# Patient Record
Sex: MALE | Race: ASIAN | NOT HISPANIC OR LATINO | URBAN - METROPOLITAN AREA
[De-identification: names, ages, dates, MRNs, and addresses within clinical notes are randomized per-mention and may not be internally consistent; named-entity substitution may affect disease eponyms.]

---

## 2018-02-19 ENCOUNTER — INPATIENT (INPATIENT)
Facility: HOSPITAL | Age: 63
LOS: 9 days | Discharge: HOME HEALTH | End: 2018-03-01
Attending: PLASTIC SURGERY

## 2018-02-19 VITALS
WEIGHT: 127.43 LBS | TEMPERATURE: 99 F | RESPIRATION RATE: 18 BRPM | HEART RATE: 80 BPM | SYSTOLIC BLOOD PRESSURE: 156 MMHG | OXYGEN SATURATION: 97 %

## 2018-02-19 DIAGNOSIS — Z98.890 OTHER SPECIFIED POSTPROCEDURAL STATES: Chronic | ICD-10-CM

## 2018-02-19 RX ORDER — SODIUM BICARBONATE 1 MEQ/ML
1 SYRINGE (ML) INTRAVENOUS
Qty: 0 | Refills: 0 | COMMUNITY

## 2018-02-19 RX ORDER — INSULIN GLARGINE 100 [IU]/ML
10 INJECTION, SOLUTION SUBCUTANEOUS
Qty: 0 | Refills: 0 | COMMUNITY

## 2018-02-19 RX ORDER — PANTOPRAZOLE SODIUM 20 MG/1
40 TABLET, DELAYED RELEASE ORAL
Qty: 0 | Refills: 0 | Status: DISCONTINUED | OUTPATIENT
Start: 2018-02-19 | End: 2018-03-01

## 2018-02-19 RX ORDER — INSULIN GLARGINE 100 [IU]/ML
20 INJECTION, SOLUTION SUBCUTANEOUS EVERY MORNING
Qty: 0 | Refills: 0 | Status: DISCONTINUED | OUTPATIENT
Start: 2018-02-19 | End: 2018-02-20

## 2018-02-19 RX ORDER — DARBEPOETIN ALFA IN POLYSORBAT 200MCG/0.4
40 PEN INJECTOR (ML) SUBCUTANEOUS
Qty: 0 | Refills: 0 | COMMUNITY

## 2018-02-19 RX ORDER — INSULIN GLARGINE 100 [IU]/ML
20 INJECTION, SOLUTION SUBCUTANEOUS
Qty: 0 | Refills: 0 | COMMUNITY

## 2018-02-19 RX ORDER — ONDANSETRON 8 MG/1
4 TABLET, FILM COATED ORAL EVERY 6 HOURS
Qty: 0 | Refills: 0 | Status: DISCONTINUED | OUTPATIENT
Start: 2018-02-19 | End: 2018-03-01

## 2018-02-19 RX ORDER — DEXTROSE 50 % IN WATER 50 %
25 SYRINGE (ML) INTRAVENOUS ONCE
Qty: 0 | Refills: 0 | Status: DISCONTINUED | OUTPATIENT
Start: 2018-02-19 | End: 2018-02-20

## 2018-02-19 RX ORDER — GLUCAGON INJECTION, SOLUTION 0.5 MG/.1ML
1 INJECTION, SOLUTION SUBCUTANEOUS ONCE
Qty: 0 | Refills: 0 | Status: DISCONTINUED | OUTPATIENT
Start: 2018-02-19 | End: 2018-02-20

## 2018-02-19 RX ORDER — CLOPIDOGREL BISULFATE 75 MG/1
75 TABLET, FILM COATED ORAL DAILY
Qty: 0 | Refills: 0 | Status: DISCONTINUED | OUTPATIENT
Start: 2018-02-19 | End: 2018-03-01

## 2018-02-19 RX ORDER — HEPARIN SODIUM 5000 [USP'U]/ML
5000 INJECTION INTRAVENOUS; SUBCUTANEOUS EVERY 8 HOURS
Qty: 0 | Refills: 0 | Status: DISCONTINUED | OUTPATIENT
Start: 2018-02-19 | End: 2018-03-01

## 2018-02-19 RX ORDER — ATORVASTATIN CALCIUM 80 MG/1
1 TABLET, FILM COATED ORAL
Qty: 0 | Refills: 0 | COMMUNITY

## 2018-02-19 RX ORDER — OXYCODONE AND ACETAMINOPHEN 5; 325 MG/1; MG/1
1 TABLET ORAL EVERY 4 HOURS
Qty: 0 | Refills: 0 | Status: DISCONTINUED | OUTPATIENT
Start: 2018-02-19 | End: 2018-02-19

## 2018-02-19 RX ORDER — ACETAMINOPHEN 500 MG
650 TABLET ORAL EVERY 6 HOURS
Qty: 0 | Refills: 0 | Status: DISCONTINUED | OUTPATIENT
Start: 2018-02-19 | End: 2018-03-01

## 2018-02-19 RX ORDER — SODIUM CHLORIDE 9 MG/ML
1000 INJECTION INTRAMUSCULAR; INTRAVENOUS; SUBCUTANEOUS
Qty: 0 | Refills: 0 | Status: DISCONTINUED | OUTPATIENT
Start: 2018-02-19 | End: 2018-02-22

## 2018-02-19 RX ORDER — DOCUSATE SODIUM 100 MG
100 CAPSULE ORAL THREE TIMES A DAY
Qty: 0 | Refills: 0 | Status: DISCONTINUED | OUTPATIENT
Start: 2018-02-19 | End: 2018-03-01

## 2018-02-19 RX ORDER — FERROUS SULFATE 325(65) MG
325 TABLET ORAL
Qty: 0 | Refills: 0 | Status: DISCONTINUED | OUTPATIENT
Start: 2018-02-19 | End: 2018-03-01

## 2018-02-19 RX ORDER — LABETALOL HCL 100 MG
1 TABLET ORAL
Qty: 0 | Refills: 0 | COMMUNITY

## 2018-02-19 RX ORDER — INSULIN GLARGINE 100 [IU]/ML
10 INJECTION, SOLUTION SUBCUTANEOUS AT BEDTIME
Qty: 0 | Refills: 0 | Status: DISCONTINUED | OUTPATIENT
Start: 2018-02-19 | End: 2018-02-20

## 2018-02-19 RX ORDER — AMLODIPINE BESYLATE 2.5 MG/1
1 TABLET ORAL
Qty: 0 | Refills: 0 | COMMUNITY

## 2018-02-19 RX ORDER — LABETALOL HCL 100 MG
200 TABLET ORAL
Qty: 0 | Refills: 0 | Status: DISCONTINUED | OUTPATIENT
Start: 2018-02-19 | End: 2018-02-24

## 2018-02-19 RX ORDER — ASPIRIN/CALCIUM CARB/MAGNESIUM 324 MG
1 TABLET ORAL
Qty: 0 | Refills: 0 | COMMUNITY

## 2018-02-19 RX ORDER — SODIUM BICARBONATE 1 MEQ/ML
650 SYRINGE (ML) INTRAVENOUS
Qty: 0 | Refills: 0 | Status: DISCONTINUED | OUTPATIENT
Start: 2018-02-19 | End: 2018-02-21

## 2018-02-19 RX ORDER — DEXTROSE 50 % IN WATER 50 %
1 SYRINGE (ML) INTRAVENOUS ONCE
Qty: 0 | Refills: 0 | Status: DISCONTINUED | OUTPATIENT
Start: 2018-02-19 | End: 2018-02-20

## 2018-02-19 RX ORDER — ERGOCALCIFEROL 1.25 MG/1
50000 CAPSULE ORAL
Qty: 0 | Refills: 0 | COMMUNITY

## 2018-02-19 RX ORDER — DEXAMETHASONE 0.5 MG/5ML
60 ELIXIR ORAL DAILY
Qty: 0 | Refills: 0 | Status: DISCONTINUED | OUTPATIENT
Start: 2018-02-20 | End: 2018-02-20

## 2018-02-19 RX ORDER — PANTOPRAZOLE SODIUM 20 MG/1
1 TABLET, DELAYED RELEASE ORAL
Qty: 0 | Refills: 0 | COMMUNITY

## 2018-02-19 RX ORDER — ACETAMINOPHEN 500 MG
650 TABLET ORAL EVERY 4 HOURS
Qty: 0 | Refills: 0 | Status: DISCONTINUED | OUTPATIENT
Start: 2018-02-19 | End: 2018-03-01

## 2018-02-19 RX ORDER — SODIUM CHLORIDE 9 MG/ML
1000 INJECTION, SOLUTION INTRAVENOUS
Qty: 0 | Refills: 0 | Status: DISCONTINUED | OUTPATIENT
Start: 2018-02-19 | End: 2018-02-20

## 2018-02-19 RX ORDER — ASPIRIN/CALCIUM CARB/MAGNESIUM 324 MG
81 TABLET ORAL DAILY
Qty: 0 | Refills: 0 | Status: DISCONTINUED | OUTPATIENT
Start: 2018-02-19 | End: 2018-03-01

## 2018-02-19 RX ORDER — FERROUS SULFATE 325(65) MG
1 TABLET ORAL
Qty: 0 | Refills: 0 | COMMUNITY

## 2018-02-19 RX ORDER — DEXTROSE 50 % IN WATER 50 %
12.5 SYRINGE (ML) INTRAVENOUS ONCE
Qty: 0 | Refills: 0 | Status: DISCONTINUED | OUTPATIENT
Start: 2018-02-19 | End: 2018-02-20

## 2018-02-19 RX ORDER — SEVELAMER CARBONATE 2400 MG/1
400 POWDER, FOR SUSPENSION ORAL DAILY
Qty: 0 | Refills: 0 | Status: DISCONTINUED | OUTPATIENT
Start: 2018-02-19 | End: 2018-02-21

## 2018-02-19 RX ORDER — SENNA PLUS 8.6 MG/1
2 TABLET ORAL AT BEDTIME
Qty: 0 | Refills: 0 | Status: DISCONTINUED | OUTPATIENT
Start: 2018-02-19 | End: 2018-03-01

## 2018-02-19 RX ORDER — AMLODIPINE BESYLATE 2.5 MG/1
5 TABLET ORAL AT BEDTIME
Qty: 0 | Refills: 0 | Status: DISCONTINUED | OUTPATIENT
Start: 2018-02-19 | End: 2018-02-22

## 2018-02-19 RX ORDER — CLOPIDOGREL BISULFATE 75 MG/1
1 TABLET, FILM COATED ORAL
Qty: 0 | Refills: 0 | COMMUNITY

## 2018-02-19 RX ORDER — SENNA PLUS 8.6 MG/1
2 TABLET ORAL
Qty: 0 | Refills: 0 | COMMUNITY

## 2018-02-19 NOTE — H&P ADULT - NSHPREVIEWOFSYSTEMS_GEN_ALL_CORE
Denies fever/chills. c/o weakness, sore throat, odynophagia, eye pain , pruritic skin rash. No Cp/dyspnea, dysuria.

## 2018-02-19 NOTE — H&P ADULT - PROBLEM SELECTOR PLAN 1
Continue decadron for 2 days as per dermatology recs  -Local wound care with xeroform , keep skin moist  -F/u Derm Bx  -IVF hydration  -pain management   -DVT PPX  -Nutrition  -PT consult

## 2018-02-19 NOTE — H&P ADULT - HISTORY OF PRESENT ILLNESS
61M with h/o HTN,HLD,DM2,CKD, gastritis , CVA , arthritis admitted to the Burn Unit for Flowers Kurt syndrome , a transfer from an outside hospital.  As per the transfer note and the patient’s son account, his sx started a few days ago with a generalized pruritic rash associated with eye pain, sore throat and generalized weakness. Initially the rash was located over his flank now evolved over his back, extremities trunk and the oral mucosa.  Patient was started on allopurinol about 3-4 weeks ago for suspicion of gout arthritis that he has been tolerated well until the onset of the rash. In the ED at the transferring hospital , he was seen by dermatology , a skin biopsy was performed, also received one dose of IV steroids as per dermatology recommendations.

## 2018-02-19 NOTE — H&P ADULT - PMH
Anemia in chronic illness    Benign hypertension with CKD (chronic kidney disease) stage IV    CVA (cerebral vascular accident)    Diabetes mellitus with end-stage renal disease    Diverticulosis    Gastritis    HTN (hypertension)    Hyperlipidemia

## 2018-02-20 LAB
ANION GAP SERPL CALC-SCNC: 12 MMOL/L — SIGNIFICANT CHANGE UP (ref 7–14)
APTT BLD: 27 SEC — SIGNIFICANT CHANGE UP (ref 27–39.2)
BUN SERPL-MCNC: 60 MG/DL — HIGH (ref 10–20)
CALCIUM SERPL-MCNC: 9 MG/DL — SIGNIFICANT CHANGE UP (ref 8.5–10.1)
CHLORIDE SERPL-SCNC: 111 MMOL/L — HIGH (ref 98–110)
CO2 SERPL-SCNC: 16 MMOL/L — LOW (ref 17–32)
CREAT SERPL-MCNC: 4 MG/DL — HIGH (ref 0.7–1.5)
ESTIMATED AVERAGE GLUCOSE: 140 MG/DL — HIGH (ref 68–114)
GLUCOSE SERPL-MCNC: 175 MG/DL — HIGH (ref 70–110)
HBA1C BLD-MCNC: 6.5 % — HIGH (ref 4–5.6)
HCT VFR BLD CALC: 34.3 % — LOW (ref 42–52)
HGB BLD-MCNC: 11.2 G/DL — LOW (ref 14–18)
INR BLD: 1.22 RATIO — SIGNIFICANT CHANGE UP (ref 0.65–1.3)
MAGNESIUM SERPL-MCNC: 1.8 MG/DL — SIGNIFICANT CHANGE UP (ref 1.8–2.4)
MCHC RBC-ENTMCNC: 27.7 PG — SIGNIFICANT CHANGE UP (ref 27–31)
MCHC RBC-ENTMCNC: 32.7 G/DL — SIGNIFICANT CHANGE UP (ref 32–37)
MCV RBC AUTO: 84.7 FL — SIGNIFICANT CHANGE UP (ref 80–94)
NRBC # BLD: 0 /100 WBCS — SIGNIFICANT CHANGE UP (ref 0–0)
PHOSPHATE SERPL-MCNC: 5.9 MG/DL — HIGH (ref 2.1–4.9)
PLATELET # BLD AUTO: 260 K/UL — SIGNIFICANT CHANGE UP (ref 130–400)
POTASSIUM SERPL-MCNC: 4.2 MMOL/L — SIGNIFICANT CHANGE UP (ref 3.5–5)
POTASSIUM SERPL-SCNC: 4.2 MMOL/L — SIGNIFICANT CHANGE UP (ref 3.5–5)
PROTHROM AB SERPL-ACNC: 13.2 SEC — HIGH (ref 9.95–12.87)
RBC # BLD: 4.05 M/UL — LOW (ref 4.7–6.1)
RBC # FLD: 15.1 % — HIGH (ref 11.5–14.5)
SODIUM SERPL-SCNC: 139 MMOL/L — SIGNIFICANT CHANGE UP (ref 135–146)
WBC # BLD: 5.28 K/UL — SIGNIFICANT CHANGE UP (ref 4.8–10.8)
WBC # FLD AUTO: 5.28 K/UL — SIGNIFICANT CHANGE UP (ref 4.8–10.8)

## 2018-02-20 RX ORDER — GLUCAGON INJECTION, SOLUTION 0.5 MG/.1ML
1 INJECTION, SOLUTION SUBCUTANEOUS ONCE
Qty: 0 | Refills: 0 | Status: DISCONTINUED | OUTPATIENT
Start: 2018-02-20 | End: 2018-03-01

## 2018-02-20 RX ORDER — DEXAMETHASONE 0.5 MG/5ML
ELIXIR ORAL
Qty: 0 | Refills: 0 | Status: DISCONTINUED | OUTPATIENT
Start: 2018-02-20 | End: 2018-02-20

## 2018-02-20 RX ORDER — INSULIN LISPRO 100/ML
5 VIAL (ML) SUBCUTANEOUS
Qty: 0 | Refills: 0 | Status: DISCONTINUED | OUTPATIENT
Start: 2018-02-20 | End: 2018-02-20

## 2018-02-20 RX ORDER — DEXTROSE 50 % IN WATER 50 %
25 SYRINGE (ML) INTRAVENOUS ONCE
Qty: 0 | Refills: 0 | Status: DISCONTINUED | OUTPATIENT
Start: 2018-02-20 | End: 2018-03-01

## 2018-02-20 RX ORDER — INSULIN GLARGINE 100 [IU]/ML
20 INJECTION, SOLUTION SUBCUTANEOUS EVERY MORNING
Qty: 0 | Refills: 0 | Status: DISCONTINUED | OUTPATIENT
Start: 2018-02-20 | End: 2018-02-22

## 2018-02-20 RX ORDER — DEXTROSE 50 % IN WATER 50 %
12.5 SYRINGE (ML) INTRAVENOUS ONCE
Qty: 0 | Refills: 0 | Status: DISCONTINUED | OUTPATIENT
Start: 2018-02-20 | End: 2018-03-01

## 2018-02-20 RX ORDER — INSULIN LISPRO 100/ML
5 VIAL (ML) SUBCUTANEOUS ONCE
Qty: 0 | Refills: 0 | Status: COMPLETED | OUTPATIENT
Start: 2018-02-20 | End: 2018-02-20

## 2018-02-20 RX ORDER — INSULIN GLARGINE 100 [IU]/ML
10 INJECTION, SOLUTION SUBCUTANEOUS AT BEDTIME
Qty: 0 | Refills: 0 | Status: DISCONTINUED | OUTPATIENT
Start: 2018-02-20 | End: 2018-02-22

## 2018-02-20 RX ORDER — INSULIN LISPRO 100/ML
VIAL (ML) SUBCUTANEOUS
Qty: 0 | Refills: 0 | Status: DISCONTINUED | OUTPATIENT
Start: 2018-02-20 | End: 2018-03-01

## 2018-02-20 RX ORDER — SODIUM CHLORIDE 9 MG/ML
1000 INJECTION, SOLUTION INTRAVENOUS
Qty: 0 | Refills: 0 | Status: DISCONTINUED | OUTPATIENT
Start: 2018-02-20 | End: 2018-03-01

## 2018-02-20 RX ORDER — WATER 99.05 ML/100ML
1 SOLUTION OPHTHALMIC DAILY
Qty: 0 | Refills: 0 | Status: DISCONTINUED | OUTPATIENT
Start: 2018-02-20 | End: 2018-02-21

## 2018-02-20 RX ORDER — DEXTROSE 50 % IN WATER 50 %
1 SYRINGE (ML) INTRAVENOUS ONCE
Qty: 0 | Refills: 0 | Status: DISCONTINUED | OUTPATIENT
Start: 2018-02-20 | End: 2018-03-01

## 2018-02-20 RX ORDER — INSULIN HUMAN 100 [IU]/ML
8 INJECTION, SOLUTION SUBCUTANEOUS ONCE
Qty: 0 | Refills: 0 | Status: COMPLETED | OUTPATIENT
Start: 2018-02-20 | End: 2018-02-20

## 2018-02-20 RX ORDER — BENZOCAINE AND MENTHOL 5; 1 G/100ML; G/100ML
1 LIQUID ORAL
Qty: 0 | Refills: 0 | Status: DISCONTINUED | OUTPATIENT
Start: 2018-02-20 | End: 2018-03-01

## 2018-02-20 RX ADMIN — Medication 650 MILLIGRAM(S): at 06:31

## 2018-02-20 RX ADMIN — Medication 5 UNIT(S): at 17:05

## 2018-02-20 RX ADMIN — HEPARIN SODIUM 5000 UNIT(S): 5000 INJECTION INTRAVENOUS; SUBCUTANEOUS at 06:30

## 2018-02-20 RX ADMIN — Medication 650 MILLIGRAM(S): at 17:52

## 2018-02-20 RX ADMIN — Medication 200 MILLIGRAM(S): at 17:52

## 2018-02-20 RX ADMIN — Medication 100 MILLIGRAM(S): at 16:57

## 2018-02-20 RX ADMIN — HEPARIN SODIUM 5000 UNIT(S): 5000 INJECTION INTRAVENOUS; SUBCUTANEOUS at 21:40

## 2018-02-20 RX ADMIN — INSULIN GLARGINE 10 UNIT(S): 100 INJECTION, SOLUTION SUBCUTANEOUS at 21:49

## 2018-02-20 RX ADMIN — Medication 100 MILLIGRAM(S): at 21:49

## 2018-02-20 RX ADMIN — HEPARIN SODIUM 5000 UNIT(S): 5000 INJECTION INTRAVENOUS; SUBCUTANEOUS at 16:58

## 2018-02-20 RX ADMIN — AMLODIPINE BESYLATE 5 MILLIGRAM(S): 2.5 TABLET ORAL at 21:48

## 2018-02-20 RX ADMIN — Medication 325 MILLIGRAM(S): at 16:55

## 2018-02-20 RX ADMIN — Medication 81 MILLIGRAM(S): at 16:55

## 2018-02-20 RX ADMIN — Medication 10: at 17:24

## 2018-02-20 RX ADMIN — Medication 200 MILLIGRAM(S): at 06:31

## 2018-02-20 RX ADMIN — CLOPIDOGREL BISULFATE 75 MILLIGRAM(S): 75 TABLET, FILM COATED ORAL at 16:56

## 2018-02-20 RX ADMIN — SEVELAMER CARBONATE 400 MILLIGRAM(S): 2400 POWDER, FOR SUSPENSION ORAL at 16:56

## 2018-02-20 RX ADMIN — INSULIN HUMAN 8 UNIT(S): 100 INJECTION, SOLUTION SUBCUTANEOUS at 21:49

## 2018-02-20 RX ADMIN — PANTOPRAZOLE SODIUM 40 MILLIGRAM(S): 20 TABLET, DELAYED RELEASE ORAL at 06:30

## 2018-02-20 RX ADMIN — INSULIN GLARGINE 20 UNIT(S): 100 INJECTION, SOLUTION SUBCUTANEOUS at 12:52

## 2018-02-20 RX ADMIN — Medication 325 MILLIGRAM(S): at 17:04

## 2018-02-20 RX ADMIN — Medication 100 MILLIGRAM(S): at 06:30

## 2018-02-20 RX ADMIN — Medication 325 MILLIGRAM(S): at 09:38

## 2018-02-20 NOTE — CONSULT NOTE ADULT - ASSESSMENT
63 yo man with PMH CKD 4 , HTN and Dm presenting for a transfer from [facility] for SJS  ·	CKD 4 as per history   ·	creat has been around 4, was told he may need HD soon  ·	No need for RRT for now  ·	on NaHCO3 po continue  ·	on IVF ok to continue follow strict I and O  ·	Repeat UA and check Urine prot/ creat ratio  ·	CKD MBD , hyperph sevelamer continue check PTH  ·	SJS on dexamthasone     will follow

## 2018-02-20 NOTE — CONSULT NOTE ADULT - SUBJECTIVE AND OBJECTIVE BOX
NEPHROLOGY CONSULTATION NOTE    Patient is a 62y Male whom presented to the hospital sp transfer from [facility] for SJS due to allopurinol (gout) .     PAST MEDICAL & SURGICAL HISTORY:  Diverticulosis  Gastritis  Hyperlipidemia  CVA (cerebral vascular accident)  Anemia in chronic illness  Diabetes mellitus with end-stage renal disease  Benign hypertension with CKD (chronic kidney disease) stage IV  HTN (hypertension)  H/O colonoscopy  H/O endoscopy  CKD 4    Allergies:  allopurinol (Rash)  shellfish (Rash)    Home Medications Reviewed  Hospital Medications:   MEDICATIONS  (STANDING):  amLODIPine   Tablet 5 milliGRAM(s) Oral at bedtime  aspirin enteric coated 81 milliGRAM(s) Oral daily  clopidogrel Tablet 75 milliGRAM(s) Oral daily  dexamethasone  IVPB 60 milliGRAM(s) IV Intermittent daily  dextrose 5%. 1000 milliLiter(s) (50 mL/Hr) IV Continuous <Continuous>  dextrose 50% Injectable 12.5 Gram(s) IV Push once  docusate sodium 100 milliGRAM(s) Oral three times a day  ferrous sulfate Oral Tab/Cap - Peds 325 milliGRAM(s) Oral three times a day with meals  heparin  Injectable 5000 Unit(s) SubCutaneous every 8 hours  insulin glargine Injectable (LANTUS) 20 Unit(s) SubCutaneous every morning  insulin glargine Injectable (LANTUS) 10 Unit(s) SubCutaneous at bedtime  insulin lispro (HumaLOG) corrective regimen sliding scale   SubCutaneous three times a day before meals  insulin lispro Injectable (HumaLOG). 5 Unit(s) SubCutaneous once  labetalol 200 milliGRAM(s) Oral two times a day  pantoprazole    Tablet 40 milliGRAM(s) Oral before breakfast  sevelamer hydrochloride 400 milliGRAM(s) Oral daily  sodium bicarbonate 650 milliGRAM(s) Oral two times a day  sodium chloride 0.9%. 1000 milliLiter(s) (75 mL/Hr) IV Continuous <Continuous>      SOCIAL HISTORY:  Denies ETOH,Smoking,   FAMILY HISTORY:  No pertinent family history in first degree relatives        REVIEW OF SYSTEMS:  CONSTITUTIONAL: No weakness, fevers or chills  EYES/ENT: No visual changes;  No vertigo or throat pain   NECK: No pain or stiffness  RESPIRATORY: No cough, wheezing, hemoptysis; No shortness of breath  CARDIOVASCULAR: No chest pain or palpitations.  GASTROINTESTINAL: No abdominal or epigastric pain. No nausea, vomiting, or hematemesis; No diarrhea or constipation. No melena or hematochezia.  GENITOURINARY: No dysuria, frequency, foamy urine, urinary urgency, incontinence or hematuria  NEUROLOGICAL: No numbness or weakness  SKIN: No itching, burning, rashes, or lesions   VASCULAR: No bilateral lower extremity edema.   All other review of systems is negative unless indicated above.    VITALS:  T(F): 95.7 (02-20-18 @ 15:41), Max: 99 (02-19-18 @ 21:15)  HR: 80 (02-20-18 @ 15:41)  BP: 162/73 (02-20-18 @ 15:41)  RR: 18 (02-20-18 @ 15:41)  SpO2: 97% (02-19-18 @ 21:15)    02-19 @ 07:01  -  02-20 @ 07:00  --------------------------------------------------------  IN: 150 mL / OUT: 200 mL / NET: -50 mL      Height (cm): 160 (02-19 @ 22:02)  Weight (kg): 58 (02-19 @ 22:02)  BMI (kg/m2): 22.7 (02-19 @ 22:02)  BSA (m2): 1.6 (02-19 @ 22:02)      I&O's Detail    19 Feb 2018 07:01  -  20 Feb 2018 07:00  --------------------------------------------------------  IN:    sodium chloride 0.9%.: 150 mL  Total IN: 150 mL    OUT:    Voided: 200 mL  Total OUT: 200 mL    Total NET: -50 mL            PHYSICAL EXAM:  Constitutional: NAD  HEENT: anicteric sclera, oropharynx clear, MMM  Neck: No JVD  Respiratory: CTAB, no wheezes, rales or rhonchi  Cardiovascular: S1, S2, RRR  Gastrointestinal: BS+, soft, NT/ND  Extremities: No cyanosis or clubbing. No peripheral edema  Neurological: A/O x 3, no focal deficits  Psychiatric: Normal mood, normal affect  : No CVA tenderness. No espinal.   Skin: No rashes  Vascular Access:    LABS:  02-20    139  |  111<H>  |  60<H>  ----------------------------<  175<H>  4.2   |  16<L>  |  4.0<H>    Ca    9.0      20 Feb 2018 08:24  Phos  5.9     02-20  Mg     1.8     02-20      Creatinine Trend: 4.0 <--                        11.2   5.28  )-----------( 260      ( 20 Feb 2018 08:24 )             34.3     Urine Studies:              RADIOLOGY & ADDITIONAL STUDIES:  < from: Xray Chest 1 View-PORTABLE IMMEDIATE (02.20.18 @ 01:10) >  Impression:      No radiographic evidence of acute cardiopulmonary disease.    < end of copied text > NEPHROLOGY CONSULTATION NOTE    Patient is a 62y Male whom presented to the hospital sp transfer from [facility] for SJS due to allopurinol (gout) .   Renal cs was called for CKD 4   Patient is seen by nephrology as OP and was told he needed HD     PAST MEDICAL & SURGICAL HISTORY:  Diverticulosis  Gastritis  Hyperlipidemia  CVA (cerebral vascular accident)  Anemia in chronic illness  Diabetes mellitus with end-stage renal disease  Benign hypertension with CKD (chronic kidney disease) stage IV  HTN (hypertension)  H/O colonoscopy  H/O endoscopy  CKD 4    Allergies:  allopurinol (Rash)  shellfish (Rash)    Home Medications Reviewed  Hospital Medications:   MEDICATIONS  (STANDING):  amLODIPine   Tablet 5 milliGRAM(s) Oral at bedtime  aspirin enteric coated 81 milliGRAM(s) Oral daily  clopidogrel Tablet 75 milliGRAM(s) Oral daily  dexamethasone  IVPB 60 milliGRAM(s) IV Intermittent daily  dextrose 5%. 1000 milliLiter(s) (50 mL/Hr) IV Continuous <Continuous>  dextrose 50% Injectable 12.5 Gram(s) IV Push once  docusate sodium 100 milliGRAM(s) Oral three times a day  ferrous sulfate Oral Tab/Cap - Peds 325 milliGRAM(s) Oral three times a day with meals  heparin  Injectable 5000 Unit(s) SubCutaneous every 8 hours  insulin glargine Injectable (LANTUS) 20 Unit(s) SubCutaneous every morning  insulin lispro Injectable (HumaLOG). 5 Unit(s) SubCutaneous once  labetalol 200 milliGRAM(s) Oral two times a day  pantoprazole    Tablet 40 milliGRAM(s) Oral before breakfast  sevelamer hydrochloride 400 milliGRAM(s) Oral daily  sodium bicarbonate 650 milliGRAM(s) Oral two times a day  sodium chloride 0.9%. 1000 milliLiter(s) (75 mL/Hr) IV Continuous <Continuous>      SOCIAL HISTORY:  Denies ETOH,Smoking,   FAMILY HISTORY:  No pertinent family history in first degree relatives        REVIEW OF SYSTEMS:  CONSTITUTIONAL: No weakness, fevers or chills  EYES/ENT: No visual changes;  No vertigo or throat pain   NECK: No pain or stiffness  RESPIRATORY: No cough, wheezing, hemoptysis; No shortness of breath  CARDIOVASCULAR: No chest pain or palpitations.  GASTROINTESTINAL: No abdominal or epigastric pain. No nausea, vomiting, or hematemesis; No diarrhea or constipation. No melena or hematochezia.  GENITOURINARY: No dysuria, frequency, foamy urine, urinary urgency, incontinence or hematuria  NEUROLOGICAL: No numbness or weakness  SKIN: multiple skin lesions   VASCULAR: No bilateral lower extremity edema.   All other review of systems is negative unless indicated above.    VITALS:  T(F): 95.7 (02-20-18 @ 15:41), Max: 99 (02-19-18 @ 21:15)  HR: 80 (02-20-18 @ 15:41)  BP: 162/73 (02-20-18 @ 15:41)  RR: 18 (02-20-18 @ 15:41)  SpO2: 97% (02-19-18 @ 21:15)    02-19 @ 07:01  -  02-20 @ 07:00  --------------------------------------------------------  IN: 150 mL / OUT: 200 mL / NET: -50 mL      Height (cm): 160 (02-19 @ 22:02)  Weight (kg): 58 (02-19 @ 22:02)  BMI (kg/m2): 22.7 (02-19 @ 22:02)  BSA (m2): 1.6 (02-19 @ 22:02)      I&O's Detail    19 Feb 2018 07:01  -  20 Feb 2018 07:00  --------------------------------------------------------  IN:    sodium chloride 0.9%.: 150 mL  Total IN: 150 mL    OUT:    Voided: 200 mL  Total OUT: 200 mL    Total NET: -50 mL            PHYSICAL EXAM:  Constitutional: NAD  HEENT: anicteric sclera, oropharynx clear, MMM  Neck: No JVD  Respiratory: CTAB, no wheezes, rales or rhonchi  Cardiovascular: S1, S2, RRR  Gastrointestinal: BS+, soft, NT/ND  Extremities: No cyanosis or clubbing. No peripheral edema  Neurological: A/O x 3, no focal deficits  Psychiatric: Normal mood, normal affect  : No CVA tenderness. No espinal.   Skin: diffuse rash   Vascular Access:    LABS:  02-20    139  |  111<H>  |  60<H>  ----------------------------<  175<H>  4.2   |  16<L>  |  4.0<H>    Ca    9.0      20 Feb 2018 08:24  Phos  5.9     02-20  Mg     1.8     02-20      Creatinine Trend: 4.0 <--                        11.2   5.28  )-----------( 260      ( 20 Feb 2018 08:24 )             34.3       RADIOLOGY & ADDITIONAL STUDIES:  < from: Xray Chest 1 View-PORTABLE IMMEDIATE (02.20.18 @ 01:10) >  Impression:      No radiographic evidence of acute cardiopulmonary disease.    < end of copied text >

## 2018-02-20 NOTE — SWALLOW BEDSIDE ASSESSMENT ADULT - SWALLOW EVAL: DIAGNOSIS
Mild oral dysphagia for thin liquids, puree, mechanical soft with no s/s aspiration/penetration. Pt with c/o sore lips when eating hard food.

## 2018-02-20 NOTE — PROGRESS NOTE ADULT - ASSESSMENT
1) Hemodynamics: stable--> continue IV fluids    2) Cardiac: stable--> continue monitoring    3) Respiratory:--> stable --> RA O2    4) GI: No ileus--> p o    5) Renal: adequate function-->I/O    6) ID: no infection    7) Nutrition: cont tube feeds/ tpn    8) TEN/SJS: continue local wound care/

## 2018-02-20 NOTE — PROGRESS NOTE ADULT - SUBJECTIVE AND OBJECTIVE BOX
1 hour critical care medicine  allergic reaction to allopurinol--> extensive rash including eyes--> tolerating po  Vital Signs Last 24 Hrs  T(C): 36.3 (20 Feb 2018 21:13), Max: 36.4 (20 Feb 2018 00:16)  T(F): 97.3 (20 Feb 2018 21:13), Max: 97.5 (20 Feb 2018 00:16)  HR: 80 (20 Feb 2018 15:41) (77 - 80)  BP: 162/73 (20 Feb 2018 15:41) (160/77 - 162/82)  BP(mean): --  RR: 18 (20 Feb 2018 15:41) (18 - 18)  SpO2: 97% (20 Feb 2018 21:30) (97% - 97%)    CVP:  T(C): 36.3 (02-20-18 @ 21:13), Max: 36.4 (02-20-18 @ 00:16)  HR: 80 (02-20-18 @ 15:41) (77 - 80)  BP: 162/73 (02-20-18 @ 15:41) (160/77 - 162/82)  RR: 18 (02-20-18 @ 15:41) (18 - 18)  SpO2: 97% (02-20-18 @ 21:30) (97% - 97%)  CVP(mm Hg): --    U.O.:  I&O's Detail    19 Feb 2018 07:01  -  20 Feb 2018 07:00  --------------------------------------------------------  IN:    sodium chloride 0.9%.: 150 mL  Total IN: 150 mL    OUT:    Voided: 200 mL  Total OUT: 200 mL    Total NET: -50 mL      20 Feb 2018 07:01  -  20 Feb 2018 22:45  --------------------------------------------------------  IN:    sodium chloride 0.9%.: 225 mL  Total IN: 225 mL    OUT:    Voided: 1455 mL  Total OUT: 1455 mL    Total NET: -1230 mL          CXR:                            11.2   5.28  )-----------( 260      ( 20 Feb 2018 08:24 )             34.3     02-20    139  |  111<H>  |  60<H>  ----------------------------<  175<H>  4.2   |  16<L>  |  4.0<H>    Ca    9.0      20 Feb 2018 08:24  Phos  5.9     02-20  Mg     1.8     02-20        Large Dressing Change--> rash palms, soles, back, abdomen, face , scalp, arms and legs

## 2018-02-20 NOTE — CONSULT NOTE ADULT - SUBJECTIVE AND OBJECTIVE BOX
T(C): 36.1 (02-20-18 @ 07:44), Max: 37.2 (02-19-18 @ 21:15)  HR: 78 (02-20-18 @ 07:44) (77 - 80)  BP: 160/77 (02-20-18 @ 07:44) (156/- - 162/82)  RR: 18 (02-20-18 @ 07:44) (18 - 18)  SpO2: 97% (02-19-18 @ 21:15) (97% - 97%)    MOTOR EXAM      Physical Medicine And Rehabilitation Services are not indicated in this patient for the following Reason(s):    [    ] Patient is medically unstable      [    ]  Patient does not have appropriate activity orders      [     ] Patient has no weight bearing status for:      [  x   ] Patient is independently ambulating      [     ]  Patient is from Skilled Nursing Facility and is appropriate to return      [     ] Patient was non-ambulatory prior to admission      [     ]  Other      WILL CANCEL PM&R / PT request

## 2018-02-21 LAB
ALBUMIN SERPL ELPH-MCNC: 2.7 G/DL — LOW (ref 3–5.5)
ALP SERPL-CCNC: 78 U/L — SIGNIFICANT CHANGE UP (ref 30–115)
ALT FLD-CCNC: 38 U/L — SIGNIFICANT CHANGE UP (ref 0–41)
ANION GAP SERPL CALC-SCNC: 8 MMOL/L — SIGNIFICANT CHANGE UP (ref 7–14)
AST SERPL-CCNC: 32 U/L — SIGNIFICANT CHANGE UP (ref 0–41)
BASOPHILS # BLD AUTO: 0.02 K/UL — SIGNIFICANT CHANGE UP (ref 0–0.2)
BASOPHILS NFR BLD AUTO: 0.1 % — SIGNIFICANT CHANGE UP (ref 0–1)
BILIRUB SERPL-MCNC: 0.4 MG/DL — SIGNIFICANT CHANGE UP (ref 0.2–1.2)
BUN SERPL-MCNC: 77 MG/DL — CRITICAL HIGH (ref 10–20)
CALCIUM SERPL-MCNC: 8.1 MG/DL — LOW (ref 8.5–10.1)
CHLORIDE SERPL-SCNC: 114 MMOL/L — HIGH (ref 98–110)
CO2 SERPL-SCNC: 17 MMOL/L — SIGNIFICANT CHANGE UP (ref 17–32)
CREAT SERPL-MCNC: 3.4 MG/DL — HIGH (ref 0.7–1.5)
EOSINOPHIL # BLD AUTO: 0.01 K/UL — SIGNIFICANT CHANGE UP (ref 0–0.7)
EOSINOPHIL NFR BLD AUTO: 0.1 % — SIGNIFICANT CHANGE UP (ref 0–8)
GLUCOSE SERPL-MCNC: 205 MG/DL — HIGH (ref 70–110)
HCT VFR BLD CALC: 30.4 % — LOW (ref 42–52)
HCT VFR BLD CALC: 31.4 % — LOW (ref 42–52)
HGB BLD-MCNC: 10.2 G/DL — LOW (ref 14–18)
HGB BLD-MCNC: 10.4 G/DL — LOW (ref 14–18)
IMM GRANULOCYTES NFR BLD AUTO: 0.8 % — HIGH (ref 0.1–0.3)
LYMPHOCYTES # BLD AUTO: 1.34 K/UL — SIGNIFICANT CHANGE UP (ref 1.2–3.4)
LYMPHOCYTES # BLD AUTO: 7.8 % — LOW (ref 20.5–51.1)
MAGNESIUM SERPL-MCNC: 1.7 MG/DL — LOW (ref 1.8–2.4)
MCHC RBC-ENTMCNC: 27.7 PG — SIGNIFICANT CHANGE UP (ref 27–31)
MCHC RBC-ENTMCNC: 28.1 PG — SIGNIFICANT CHANGE UP (ref 27–31)
MCHC RBC-ENTMCNC: 33.1 G/DL — SIGNIFICANT CHANGE UP (ref 32–37)
MCHC RBC-ENTMCNC: 33.6 G/DL — SIGNIFICANT CHANGE UP (ref 32–37)
MCV RBC AUTO: 83.7 FL — SIGNIFICANT CHANGE UP (ref 80–94)
MCV RBC AUTO: 83.7 FL — SIGNIFICANT CHANGE UP (ref 80–94)
MONOCYTES # BLD AUTO: 1.18 K/UL — HIGH (ref 0.1–0.6)
MONOCYTES NFR BLD AUTO: 6.9 % — SIGNIFICANT CHANGE UP (ref 1.7–9.3)
NEUTROPHILS # BLD AUTO: 14.5 K/UL — HIGH (ref 1.4–6.5)
NEUTROPHILS NFR BLD AUTO: 84.3 % — HIGH (ref 42.2–75.2)
NRBC # BLD: 0 /100 WBCS — SIGNIFICANT CHANGE UP (ref 0–0)
NRBC # BLD: 0 /100 WBCS — SIGNIFICANT CHANGE UP (ref 0–0)
PHOSPHATE SERPL-MCNC: 3.5 MG/DL — SIGNIFICANT CHANGE UP (ref 2.1–4.9)
PLATELET # BLD AUTO: 243 K/UL — SIGNIFICANT CHANGE UP (ref 130–400)
PLATELET # BLD AUTO: 266 K/UL — SIGNIFICANT CHANGE UP (ref 130–400)
POTASSIUM SERPL-MCNC: 4.3 MMOL/L — SIGNIFICANT CHANGE UP (ref 3.5–5)
POTASSIUM SERPL-SCNC: 4.3 MMOL/L — SIGNIFICANT CHANGE UP (ref 3.5–5)
PROT SERPL-MCNC: 5.4 G/DL — LOW (ref 6–8)
RBC # BLD: 3.63 M/UL — LOW (ref 4.7–6.1)
RBC # BLD: 3.75 M/UL — LOW (ref 4.7–6.1)
RBC # FLD: 15 % — HIGH (ref 11.5–14.5)
RBC # FLD: 15.4 % — HIGH (ref 11.5–14.5)
SODIUM SERPL-SCNC: 139 MMOL/L — SIGNIFICANT CHANGE UP (ref 135–146)
WBC # BLD: 15.24 K/UL — HIGH (ref 4.8–10.8)
WBC # BLD: 17.18 K/UL — HIGH (ref 4.8–10.8)
WBC # FLD AUTO: 15.24 K/UL — HIGH (ref 4.8–10.8)
WBC # FLD AUTO: 17.18 K/UL — HIGH (ref 4.8–10.8)

## 2018-02-21 RX ORDER — IMMUNE GLOBULIN,GAMMA(IGG) 5 %
25 VIAL (ML) INTRAVENOUS EVERY 24 HOURS
Qty: 0 | Refills: 0 | Status: COMPLETED | OUTPATIENT
Start: 2018-02-21 | End: 2018-02-25

## 2018-02-21 RX ORDER — IMMUNE GLOBULIN,GAMMA(IGG) 5 %
25 VIAL (ML) INTRAVENOUS EVERY 24 HOURS
Qty: 0 | Refills: 0 | Status: DISCONTINUED | OUTPATIENT
Start: 2018-02-21 | End: 2018-02-21

## 2018-02-21 RX ORDER — BENZOCAINE 10 %
1 GEL (GRAM) MUCOUS MEMBRANE THREE TIMES A DAY
Qty: 0 | Refills: 0 | Status: DISCONTINUED | OUTPATIENT
Start: 2018-02-21 | End: 2018-03-01

## 2018-02-21 RX ORDER — SODIUM BICARBONATE 1 MEQ/ML
650 SYRINGE (ML) INTRAVENOUS EVERY 8 HOURS
Qty: 0 | Refills: 0 | Status: DISCONTINUED | OUTPATIENT
Start: 2018-02-21 | End: 2018-03-01

## 2018-02-21 RX ORDER — SEVELAMER CARBONATE 2400 MG/1
400 POWDER, FOR SUSPENSION ORAL
Qty: 0 | Refills: 0 | Status: DISCONTINUED | OUTPATIENT
Start: 2018-02-21 | End: 2018-02-22

## 2018-02-21 RX ORDER — MAGNESIUM SULFATE 500 MG/ML
2 VIAL (ML) INJECTION ONCE
Qty: 0 | Refills: 0 | Status: COMPLETED | OUTPATIENT
Start: 2018-02-21 | End: 2018-02-22

## 2018-02-21 RX ADMIN — SEVELAMER CARBONATE 400 MILLIGRAM(S): 2400 POWDER, FOR SUSPENSION ORAL at 18:16

## 2018-02-21 RX ADMIN — INSULIN GLARGINE 20 UNIT(S): 100 INJECTION, SOLUTION SUBCUTANEOUS at 08:53

## 2018-02-21 RX ADMIN — Medication 200 MILLIGRAM(S): at 18:16

## 2018-02-21 RX ADMIN — Medication 650 MILLIGRAM(S): at 09:30

## 2018-02-21 RX ADMIN — Medication 650 MILLIGRAM(S): at 09:01

## 2018-02-21 RX ADMIN — SEVELAMER CARBONATE 400 MILLIGRAM(S): 2400 POWDER, FOR SUSPENSION ORAL at 15:16

## 2018-02-21 RX ADMIN — HEPARIN SODIUM 5000 UNIT(S): 5000 INJECTION INTRAVENOUS; SUBCUTANEOUS at 05:35

## 2018-02-21 RX ADMIN — CLOPIDOGREL BISULFATE 75 MILLIGRAM(S): 75 TABLET, FILM COATED ORAL at 12:44

## 2018-02-21 RX ADMIN — Medication 41.67 GRAM(S): at 21:15

## 2018-02-21 RX ADMIN — PANTOPRAZOLE SODIUM 40 MILLIGRAM(S): 20 TABLET, DELAYED RELEASE ORAL at 08:56

## 2018-02-21 RX ADMIN — Medication 1 DROP(S): at 23:23

## 2018-02-21 RX ADMIN — INSULIN GLARGINE 10 UNIT(S): 100 INJECTION, SOLUTION SUBCUTANEOUS at 23:16

## 2018-02-21 RX ADMIN — AMLODIPINE BESYLATE 5 MILLIGRAM(S): 2.5 TABLET ORAL at 23:15

## 2018-02-21 RX ADMIN — Medication 1 DROP(S): at 18:11

## 2018-02-21 RX ADMIN — Medication 2: at 18:12

## 2018-02-21 RX ADMIN — Medication 2: at 08:56

## 2018-02-21 RX ADMIN — Medication 325 MILLIGRAM(S): at 18:13

## 2018-02-21 RX ADMIN — Medication 650 MILLIGRAM(S): at 23:15

## 2018-02-21 RX ADMIN — Medication 81 MILLIGRAM(S): at 12:43

## 2018-02-21 RX ADMIN — HEPARIN SODIUM 5000 UNIT(S): 5000 INJECTION INTRAVENOUS; SUBCUTANEOUS at 15:17

## 2018-02-21 RX ADMIN — SODIUM CHLORIDE 75 MILLILITER(S): 9 INJECTION INTRAMUSCULAR; INTRAVENOUS; SUBCUTANEOUS at 18:24

## 2018-02-21 RX ADMIN — Medication 4: at 12:42

## 2018-02-21 RX ADMIN — Medication 100 MILLIGRAM(S): at 05:34

## 2018-02-21 RX ADMIN — Medication 650 MILLIGRAM(S): at 05:35

## 2018-02-21 RX ADMIN — Medication 100 MILLIGRAM(S): at 15:18

## 2018-02-21 RX ADMIN — Medication 100 MILLIGRAM(S): at 23:15

## 2018-02-21 RX ADMIN — Medication 325 MILLIGRAM(S): at 12:42

## 2018-02-21 RX ADMIN — HEPARIN SODIUM 5000 UNIT(S): 5000 INJECTION INTRAVENOUS; SUBCUTANEOUS at 23:16

## 2018-02-21 RX ADMIN — Medication 200 MILLIGRAM(S): at 05:35

## 2018-02-21 RX ADMIN — Medication 325 MILLIGRAM(S): at 08:55

## 2018-02-21 RX ADMIN — Medication 1 SPRAY(S): at 23:15

## 2018-02-21 NOTE — PROGRESS NOTE ADULT - ASSESSMENT
ASSESSMENT/ PLAN :   Stable but guarded   TENS  bx (+) results from outside hospital ~ 27 % involvement. Local wound care to open sites         begin IVIG tx  GI: dysphagia- continue soft mechanical diet with thick liq per S+S rec. FT if worsens  Ophthalmologic- continue eye tx  DM  - elevated BS may be related to steroid tx. cont monitoring and regimen  CKD- discussed with nephrology. Increase Sevalamer and monitor fluid intake closely.  Continue VTE / GI prophylaxis   Recent CVA- continue ASA and Plavix.   OT , PT     Pain mgmt  Htn : continue antihypertensive regimen and monitoring                   Care discussed with patient and son

## 2018-02-21 NOTE — PROGRESS NOTE ADULT - ASSESSMENT
63 yo man with PMH CKD 4 , HTN and Dm presenting for a transfer from [facility] for SJS  ·	CKD 4 stable    ·	creat has been around 4, was told he may need HD soon  ·	No need for RRT for now  ·	on NaHCO3 po continue increase to 650 mg q8h  ·	on IVF ok to continue follow strict I and O  ·	Repeat UA and check Urine prot/ creat ratio  ·	CKD MBD , hyperphosphatemia on  sevelamer continue and increase to 1 tab po q8h check PTH  ·	SJS on dexamthasone no antibiotics     will follow

## 2018-02-21 NOTE — PROGRESS NOTE ADULT - SUBJECTIVE AND OBJECTIVE BOX
Nephrology progress note    Patient is seen and examined, events over the last 24 h noted .  No events no complaints   D/W son at bedside     Allergies:  allopurinol (Rash)  shellfish (Rash)    Hospital Medications:   MEDICATIONS  (STANDING):  amLODIPine   Tablet 5 milliGRAM(s) Oral at bedtime  aspirin enteric coated 81 milliGRAM(s) Oral daily  clopidogrel Tablet 75 milliGRAM(s) Oral daily  docusate sodium 100 milliGRAM(s) Oral three times a day  ferrous sulfate Oral Tab/Cap - Peds 325 milliGRAM(s) Oral three times a day with meals  heparin  Injectable 5000 Unit(s) SubCutaneous every 8 hours  labetalol 200 milliGRAM(s) Oral two times a day  ophthalmic irrigation, extraocular 1 Application(s) Both EYES daily  pantoprazole    Tablet 40 milliGRAM(s) Oral before breakfast  sevelamer hydrochloride 400 milliGRAM(s) Oral daily  sodium bicarbonate 650 milliGRAM(s) Oral two times a day  sodium chloride 0.9%. 1000 milliLiter(s) (75 mL/Hr) IV Continuous <Continuous>        VITALS:  T(F): 96.5 (02-21-18 @ 08:24), Max: 97.3 (02-20-18 @ 21:13)  HR: 97 (02-21-18 @ 08:24)  BP: 158/73 (02-21-18 @ 08:24)  RR: 19 (02-21-18 @ 08:24)  SpO2: 97% (02-20-18 @ 23:17)      02-19 @ 07:01  -  02-20 @ 07:00  --------------------------------------------------------  IN: 150 mL / OUT: 200 mL / NET: -50 mL    02-20 @ 07:01  -  02-21 @ 07:00  --------------------------------------------------------  IN: 900 mL / OUT: 2455 mL / NET: -1555 mL    02-21 @ 07:01  -  02-21 @ 14:19  --------------------------------------------------------  IN: 450 mL / OUT: 350 mL / NET: 100 mL          PHYSICAL EXAM:  Constitutional: NAD  HEENT: anicteric sclera, oropharynx clear, MMM  Neck: No JVD  Respiratory: CTAB, no wheezes, rales or rhonchi  Cardiovascular: S1, S2, RRR  Gastrointestinal: BS+, soft, NT/ND  Extremities: No cyanosis or clubbing. No peripheral edema   Skin: diffuse rash and petechia     LABS:  02-20    139  |  111<H>  |  60<H>  ----------------------------<  175<H>  4.2   |  16<L>  |  4.0<H>    Ca    9.0      20 Feb 2018 08:24  Phos  5.9     02-20  Mg     1.8     02-20                            10.2   17.18 )-----------( 243      ( 21 Feb 2018 11:43 )             30.4       Urine Studies:      RADIOLOGY & ADDITIONAL STUDIES:  < from: Xray Chest 1 View-PORTABLE IMMEDIATE (02.20.18 @ 01:10) >  Impression:      No radiographic evidence of acute cardiopulmonary disease.    < end of copied text >

## 2018-02-21 NOTE — PROGRESS NOTE ADULT - SUBJECTIVE AND OBJECTIVE BOX
Pt seen and discussed on am rounds  Pt c/o increased general malaise and dysphagia today, though eyes less irritated   No acute events o/n  Vital Signs Last 24 Hrs  T, Max: 97.1 (21 Feb 2018 16:48)  HR: 71 (21 Feb 2018 21:15) (71 - 97)  BP: 165/73 (21 Feb 2018 21:15) (148/70 - 175/79)  BP(mean): --  RR: 18 (21 Feb 2018 21:15) (18 - 19)  SpO2: 97% (21 Feb 2018 21:15) (97% - 97%)        I&O's Summary    20 Feb 2018 07:01  -  21 Feb 2018 07:00  --------------------------------------------------------  IN: 975 mL / OUT: 2455 mL / NET: -1480 mL    21 Feb 2018 07:01  -  21 Feb 2018 22:20  --------------------------------------------------------  IN: 900 mL / OUT: 700 mL / NET: 200 mL  voiding adequate amts        02-21    139  |  114<H>  |  77<HH>  ----------------------------<  205<H>  4.3   |  17  |  3.4<H>    Ca    8.1<L>      21 Feb 2018 17:39  Phos  3.5     02-21  Mg     1.7     02-21    TPro  5.4<L>  /  Alb  2.7<L>  /  TBili  0.4  /  DBili  x   /  AST  32  /  ALT  38  /  AlkPhos  78  02-21                          10.4   15.24 )-----------( 266      ( 21 Feb 2018 17:39 )             31.4     CAPILLARY BLOOD GLUCOSE  243 (21 Feb 2018 21:02)  197 (21 Feb 2018 17:08)  228 (21 Feb 2018 11:52)  168 (21 Feb 2018 08:24)  166 (21 Feb 2018 05:36)  247 (21 Feb 2018 00:41)      PT: awake alert   WOUNDS:   Eyes: less crusted drainage.  Intraoral- ulcerations of lips, buccal mucosa and hard palate  ext and torso : generalized maculo- papular rash scalp , lower back, scattered areas bLE ,  slightly increased on  palms, soles and abdomen- with few small open wounds

## 2018-02-22 LAB
-  AMIKACIN: SIGNIFICANT CHANGE UP
-  AMIKACIN: SIGNIFICANT CHANGE UP
-  AMPICILLIN/SULBACTAM: SIGNIFICANT CHANGE UP
-  AMPICILLIN/SULBACTAM: SIGNIFICANT CHANGE UP
-  AMPICILLIN: SIGNIFICANT CHANGE UP
-  AMPICILLIN: SIGNIFICANT CHANGE UP
-  AZTREONAM: SIGNIFICANT CHANGE UP
-  AZTREONAM: SIGNIFICANT CHANGE UP
-  CEFAZOLIN: SIGNIFICANT CHANGE UP
-  CEFAZOLIN: SIGNIFICANT CHANGE UP
-  CEFEPIME: SIGNIFICANT CHANGE UP
-  CEFEPIME: SIGNIFICANT CHANGE UP
-  CEFOXITIN: SIGNIFICANT CHANGE UP
-  CEFOXITIN: SIGNIFICANT CHANGE UP
-  CEFTAZIDIME: SIGNIFICANT CHANGE UP
-  CEFTAZIDIME: SIGNIFICANT CHANGE UP
-  CEFTRIAXONE: SIGNIFICANT CHANGE UP
-  CEFTRIAXONE: SIGNIFICANT CHANGE UP
-  CIPROFLOXACIN: SIGNIFICANT CHANGE UP
-  CIPROFLOXACIN: SIGNIFICANT CHANGE UP
-  ERTAPENEM: SIGNIFICANT CHANGE UP
-  ERTAPENEM: SIGNIFICANT CHANGE UP
-  GENTAMICIN: SIGNIFICANT CHANGE UP
-  GENTAMICIN: SIGNIFICANT CHANGE UP
-  IMIPENEM: SIGNIFICANT CHANGE UP
-  IMIPENEM: SIGNIFICANT CHANGE UP
-  LEVOFLOXACIN: SIGNIFICANT CHANGE UP
-  LEVOFLOXACIN: SIGNIFICANT CHANGE UP
-  MEROPENEM: SIGNIFICANT CHANGE UP
-  MEROPENEM: SIGNIFICANT CHANGE UP
-  PIPERACILLIN/TAZOBACTAM: SIGNIFICANT CHANGE UP
-  PIPERACILLIN/TAZOBACTAM: SIGNIFICANT CHANGE UP
-  TOBRAMYCIN: SIGNIFICANT CHANGE UP
-  TOBRAMYCIN: SIGNIFICANT CHANGE UP
-  TRIMETHOPRIM/SULFAMETHOXAZOLE: SIGNIFICANT CHANGE UP
-  TRIMETHOPRIM/SULFAMETHOXAZOLE: SIGNIFICANT CHANGE UP
ANION GAP SERPL CALC-SCNC: 8 MMOL/L — SIGNIFICANT CHANGE UP (ref 7–14)
APTT BLD: 51 SEC — HIGH (ref 27–39.2)
BUN SERPL-MCNC: 65 MG/DL — CRITICAL HIGH (ref 10–20)
CALCIUM SERPL-MCNC: 8.2 MG/DL — LOW (ref 8.5–10.1)
CALCIUM SERPL-MCNC: 8.5 MG/DL — SIGNIFICANT CHANGE UP (ref 8.4–10.5)
CHLORIDE SERPL-SCNC: 115 MMOL/L — HIGH (ref 98–110)
CO2 SERPL-SCNC: 17 MMOL/L — SIGNIFICANT CHANGE UP (ref 17–32)
CREAT SERPL-MCNC: 3 MG/DL — HIGH (ref 0.7–1.5)
CULTURE RESULTS: NO GROWTH — SIGNIFICANT CHANGE UP
CULTURE RESULTS: SIGNIFICANT CHANGE UP
GLUCOSE SERPL-MCNC: 93 MG/DL — SIGNIFICANT CHANGE UP (ref 70–110)
HCT VFR BLD CALC: 32.2 % — LOW (ref 42–52)
HGB BLD-MCNC: 10.6 G/DL — LOW (ref 14–18)
INR BLD: 1.16 RATIO — SIGNIFICANT CHANGE UP (ref 0.65–1.3)
MAGNESIUM SERPL-MCNC: 2.1 MG/DL — SIGNIFICANT CHANGE UP (ref 1.8–2.4)
MCHC RBC-ENTMCNC: 27.7 PG — SIGNIFICANT CHANGE UP (ref 27–31)
MCHC RBC-ENTMCNC: 32.9 G/DL — SIGNIFICANT CHANGE UP (ref 32–37)
MCV RBC AUTO: 84.3 FL — SIGNIFICANT CHANGE UP (ref 80–94)
METHOD TYPE: SIGNIFICANT CHANGE UP
METHOD TYPE: SIGNIFICANT CHANGE UP
NRBC # BLD: 0 /100 WBCS — SIGNIFICANT CHANGE UP (ref 0–0)
ORGANISM # SPEC MICROSCOPIC CNT: SIGNIFICANT CHANGE UP
PHOSPHATE SERPL-MCNC: 3.7 MG/DL — SIGNIFICANT CHANGE UP (ref 2.1–4.9)
PLATELET # BLD AUTO: 241 K/UL — SIGNIFICANT CHANGE UP (ref 130–400)
POTASSIUM SERPL-MCNC: 4.5 MMOL/L — SIGNIFICANT CHANGE UP (ref 3.5–5)
POTASSIUM SERPL-SCNC: 4.5 MMOL/L — SIGNIFICANT CHANGE UP (ref 3.5–5)
PROTHROM AB SERPL-ACNC: 12.6 SEC — SIGNIFICANT CHANGE UP (ref 9.95–12.87)
PTH-INTACT FLD-MCNC: 69 PG/ML — HIGH (ref 15–65)
RBC # BLD: 3.82 M/UL — LOW (ref 4.7–6.1)
RBC # FLD: 15.7 % — HIGH (ref 11.5–14.5)
SODIUM SERPL-SCNC: 140 MMOL/L — SIGNIFICANT CHANGE UP (ref 135–146)
SPECIMEN SOURCE: SIGNIFICANT CHANGE UP
SPECIMEN SOURCE: SIGNIFICANT CHANGE UP
WBC # BLD: 10.63 K/UL — SIGNIFICANT CHANGE UP (ref 4.8–10.8)
WBC # FLD AUTO: 10.63 K/UL — SIGNIFICANT CHANGE UP (ref 4.8–10.8)

## 2018-02-22 RX ORDER — SODIUM CHLORIDE 9 MG/ML
1000 INJECTION, SOLUTION INTRAVENOUS
Qty: 0 | Refills: 0 | Status: DISCONTINUED | OUTPATIENT
Start: 2018-02-22 | End: 2018-02-23

## 2018-02-22 RX ORDER — AMLODIPINE BESYLATE 2.5 MG/1
10 TABLET ORAL AT BEDTIME
Qty: 0 | Refills: 0 | Status: DISCONTINUED | OUTPATIENT
Start: 2018-02-22 | End: 2018-03-01

## 2018-02-22 RX ORDER — SEVELAMER CARBONATE 2400 MG/1
400 POWDER, FOR SUSPENSION ORAL
Qty: 0 | Refills: 0 | Status: DISCONTINUED | OUTPATIENT
Start: 2018-02-22 | End: 2018-02-22

## 2018-02-22 RX ORDER — SEVELAMER CARBONATE 2400 MG/1
800 POWDER, FOR SUSPENSION ORAL
Qty: 0 | Refills: 0 | Status: DISCONTINUED | OUTPATIENT
Start: 2018-02-22 | End: 2018-02-23

## 2018-02-22 RX ORDER — MORPHINE SULFATE 50 MG/1
2 CAPSULE, EXTENDED RELEASE ORAL EVERY 4 HOURS
Qty: 0 | Refills: 0 | Status: DISCONTINUED | OUTPATIENT
Start: 2018-02-22 | End: 2018-02-25

## 2018-02-22 RX ORDER — MIDAZOLAM HYDROCHLORIDE 1 MG/ML
1 INJECTION, SOLUTION INTRAMUSCULAR; INTRAVENOUS ONCE
Qty: 0 | Refills: 0 | Status: DISCONTINUED | OUTPATIENT
Start: 2018-02-22 | End: 2018-02-22

## 2018-02-22 RX ORDER — DIPHENHYDRAMINE HYDROCHLORIDE AND LIDOCAINE HYDROCHLORIDE AND ALUMINUM HYDROXIDE AND MAGNESIUM HYDRO
30 KIT EVERY 4 HOURS
Qty: 0 | Refills: 0 | Status: DISCONTINUED | OUTPATIENT
Start: 2018-02-22 | End: 2018-03-01

## 2018-02-22 RX ORDER — TOBRAMYCIN AND DEXAMETHASONE 1; 3 MG/ML; MG/ML
1 SUSPENSION/ DROPS OPHTHALMIC EVERY 6 HOURS
Qty: 0 | Refills: 0 | Status: DISCONTINUED | OUTPATIENT
Start: 2018-02-22 | End: 2018-02-23

## 2018-02-22 RX ADMIN — HEPARIN SODIUM 5000 UNIT(S): 5000 INJECTION INTRAVENOUS; SUBCUTANEOUS at 13:26

## 2018-02-22 RX ADMIN — Medication 650 MILLIGRAM(S): at 07:22

## 2018-02-22 RX ADMIN — MORPHINE SULFATE 2 MILLIGRAM(S): 50 CAPSULE, EXTENDED RELEASE ORAL at 13:20

## 2018-02-22 RX ADMIN — HEPARIN SODIUM 5000 UNIT(S): 5000 INJECTION INTRAVENOUS; SUBCUTANEOUS at 21:22

## 2018-02-22 RX ADMIN — Medication 325 MILLIGRAM(S): at 13:28

## 2018-02-22 RX ADMIN — Medication 1 SPRAY(S): at 21:25

## 2018-02-22 RX ADMIN — SODIUM CHLORIDE 75 MILLILITER(S): 9 INJECTION, SOLUTION INTRAVENOUS at 16:30

## 2018-02-22 RX ADMIN — CLOPIDOGREL BISULFATE 75 MILLIGRAM(S): 75 TABLET, FILM COATED ORAL at 13:28

## 2018-02-22 RX ADMIN — MORPHINE SULFATE 2 MILLIGRAM(S): 50 CAPSULE, EXTENDED RELEASE ORAL at 16:33

## 2018-02-22 RX ADMIN — SEVELAMER CARBONATE 800 MILLIGRAM(S): 2400 POWDER, FOR SUSPENSION ORAL at 18:20

## 2018-02-22 RX ADMIN — AMLODIPINE BESYLATE 10 MILLIGRAM(S): 2.5 TABLET ORAL at 21:32

## 2018-02-22 RX ADMIN — Medication 1 DROP(S): at 05:20

## 2018-02-22 RX ADMIN — Medication 41.67 GRAM(S): at 21:18

## 2018-02-22 RX ADMIN — Medication 650 MILLIGRAM(S): at 05:20

## 2018-02-22 RX ADMIN — Medication 200 MILLIGRAM(S): at 18:27

## 2018-02-22 RX ADMIN — Medication 100 MILLIGRAM(S): at 21:22

## 2018-02-22 RX ADMIN — PANTOPRAZOLE SODIUM 40 MILLIGRAM(S): 20 TABLET, DELAYED RELEASE ORAL at 09:33

## 2018-02-22 RX ADMIN — Medication 650 MILLIGRAM(S): at 13:30

## 2018-02-22 RX ADMIN — HEPARIN SODIUM 5000 UNIT(S): 5000 INJECTION INTRAVENOUS; SUBCUTANEOUS at 05:21

## 2018-02-22 RX ADMIN — DIPHENHYDRAMINE HYDROCHLORIDE AND LIDOCAINE HYDROCHLORIDE AND ALUMINUM HYDROXIDE AND MAGNESIUM HYDRO 30 MILLILITER(S): KIT at 21:25

## 2018-02-22 RX ADMIN — Medication 200 MILLIGRAM(S): at 05:32

## 2018-02-22 RX ADMIN — Medication 325 MILLIGRAM(S): at 18:27

## 2018-02-22 RX ADMIN — Medication 100 MILLIGRAM(S): at 05:20

## 2018-02-22 RX ADMIN — Medication 50 GRAM(S): at 01:23

## 2018-02-22 RX ADMIN — Medication 1 DROP(S): at 23:35

## 2018-02-22 RX ADMIN — Medication 100 MILLIGRAM(S): at 13:29

## 2018-02-22 RX ADMIN — Medication 650 MILLIGRAM(S): at 21:21

## 2018-02-22 RX ADMIN — DIPHENHYDRAMINE HYDROCHLORIDE AND LIDOCAINE HYDROCHLORIDE AND ALUMINUM HYDROXIDE AND MAGNESIUM HYDRO 30 MILLILITER(S): KIT at 18:26

## 2018-02-22 RX ADMIN — Medication 1 DROP(S): at 13:23

## 2018-02-22 RX ADMIN — Medication 650 MILLIGRAM(S): at 05:32

## 2018-02-22 RX ADMIN — MIDAZOLAM HYDROCHLORIDE 1 MILLIGRAM(S): 1 INJECTION, SOLUTION INTRAMUSCULAR; INTRAVENOUS at 15:32

## 2018-02-22 RX ADMIN — Medication 81 MILLIGRAM(S): at 13:24

## 2018-02-22 RX ADMIN — Medication 1 DROP(S): at 18:22

## 2018-02-22 NOTE — CONSULT NOTE ADULT - SUBJECTIVE AND OBJECTIVE BOX
MEHNAZ NICOLE  MRN-[id number]  62y Male      Patient is a 62y old  Male who presents with a chief complaint of Rash on body (20 Feb 2018 05:11)    HEALTH ISSUES - R/O PROBLEM Dx:    HPI:  61M with h/o HTN,HLD,DM2,CKD, gastritis , CVA , arthritis admitted to the Burn Unit for Flowers Kurt syndrome , a transfer from an outside hospital.  As per the transfer note and the patient’s son account, his sx started a few days ago with a generalized pruritic rash associated with eye pain, sore throat and generalized weakness. Initially the rash was located over his flank now evolved over his back, extremities trunk and the oral mucosa.  Patient was started on allopurinol about 3-4 weeks ago for suspicion of gout arthritis that he has been tolerated well until the onset of the rash. In the ED at the transferring hospital , he was seen by dermatology , a skin biopsy was performed, also received one dose of IV steroids as per dermatology recommendations. (19 Feb 2018 23:26)    PAST MEDICAL & SURGICAL HISTORY:  Diverticulosis  Gastritis  Hyperlipidemia  CVA (cerebral vascular accident)  Anemia in chronic illness  Diabetes mellitus with end-stage renal disease  Benign hypertension with CKD (chronic kidney disease) stage IV  HTN (hypertension)  H/O colonoscopy  H/O endoscopy    MEDICATIONS  (STANDING):  amLODIPine   Tablet 5 milliGRAM(s) Oral at bedtime  artificial  tears Solution 1 Drop(s) Both EYES every 6 hours  aspirin enteric coated 81 milliGRAM(s) Oral daily  benzocaine 20% Spray 1 Spray(s) Topical three times a day  clopidogrel Tablet 75 milliGRAM(s) Oral daily  dextrose 5%. 1000 milliLiter(s) (50 mL/Hr) IV Continuous <Continuous>  dextrose 50% Injectable 12.5 Gram(s) IV Push once  dextrose 50% Injectable 25 Gram(s) IV Push once  dextrose 50% Injectable 25 Gram(s) IV Push once  docusate sodium 100 milliGRAM(s) Oral three times a day  ferrous sulfate Oral Tab/Cap - Peds 325 milliGRAM(s) Oral three times a day with meals  FIRST- Mouthwash  BLM 30 milliLiter(s) Swish and Spit every 4 hours  heparin  Injectable 5000 Unit(s) SubCutaneous every 8 hours  immune globulin gamma IVPB 25 Gram(s) IV Intermittent every 24 hours  insulin glargine Injectable (LANTUS) 20 Unit(s) SubCutaneous every morning  insulin glargine Injectable (LANTUS) 10 Unit(s) SubCutaneous at bedtime  insulin lispro (HumaLOG) corrective regimen sliding scale   SubCutaneous three times a day before meals  insulin lispro Injectable (HumaLOG). 5 Unit(s) SubCutaneous once  labetalol 200 milliGRAM(s) Oral two times a day  pantoprazole    Tablet 40 milliGRAM(s) Oral before breakfast  sevelamer carbonate Oral Powder - Peds 800 milliGRAM(s) Oral two times a day with meals  sodium bicarbonate 650 milliGRAM(s) Oral every 8 hours  sodium chloride 0.9%. 1000 milliLiter(s) (75 mL/Hr) IV Continuous <Continuous>    MEDICATIONS  (PRN):  acetaminophen   Tablet 650 milliGRAM(s) Oral every 4 hours PRN For Temp greater than 38.5 C (101.3 F)  acetaminophen   Tablet. 650 milliGRAM(s) Oral every 6 hours PRN Mild Pain (1 - 3)  benzocaine 15 mG/menthol 3.6 mG Lozenge 1 Lozenge Oral every 3 hours PRN Sore Throat  bisacodyl 5 milliGRAM(s) Oral daily PRN Constipation  dextrose Gel 1 Dose(s) Oral once PRN Blood Glucose LESS THAN 70 milliGRAM(s)/deciliter  glucagon  Injectable 1 milliGRAM(s) IntraMuscular once PRN Glucose LESS THAN 70 milligrams/deciliter  ondansetron Injectable 4 milliGRAM(s) IV Push every 6 hours PRN Nausea  oxyCODONE    5 mG/acetaminophen 325 mG 1 Tablet(s) Oral every 4 hours PRN Severe Pain (7 - 10)  senna 2 Tablet(s) Oral at bedtime PRN Constipation    Allergies    allopurinol (Rash)  shellfish (Rash)    Intolerances      HEALTH ISSUES - PROBLEM Dx:  CC: Pt with h/o diagnosed SJS c/o eye irritation for 1 week; reports itchy eyes with redness and irritation, denies acute change in vision, flashes, floaters eye pain  - Pt reports having rash break our 4 days ago associated with the eye redness and discomfort  - Pt is oriented and responds readily  - Pt denies flashes, floaters, eye pain OU        ENRRIQUE: 2 weeks  POHx:  Anatomically narrow angle s/p LPI OU  Proliferative Diabetic retinopathy s/p laser OD  (Pt was scheduled to have laser OS this Monday but instead was admitted to [facility]    FOHx:  noncontributory    EXAMINATION:    EXTERNAL:  ACUITY:       RIGHT EYE: 20/40sc PH: NI                   LEFT EYE: 20/80sc PH: 20/70+    EXTRAOCULAR MOVEMENTS: FULL OD/OS  PUPILS: PERRL, (-)APD  VFc: grossly full     ANTERIOR SEGMENT:    LIDS/ADENEXA:   CONJUNCTIVA/SCLERA:  CORNEA:  ANTERIOR CHAMBER:   IRIS/PUPIL:  LENS/VITREOUS:    INTRAOCULAR PRESSURE:  OD:                     OS:         POSTERIOR SEGMENT:  DFE: 1% Tropicamide, 2.5 % Phenylephrine OU    OPTIC NERVE:  MACULA:  A/V:   FUNDUS/PERIPHERY:    SUPPLEMENTAL TESTING: MEHNAZ NICOLE  MRN-[id number]  62y Male      Patient is a 62y old  Male who presents with a chief complaint of Rash on body (20 Feb 2018 05:11)    HEALTH ISSUES:    HPI:  61M with h/o HTN,HLD,DM2,CKD, gastritis , CVA , arthritis admitted to the Burn Unit for Flowers Kurt syndrome , a transfer from an outside hospital.  As per the transfer note and the patient’s son account, his sx started a few days ago with a generalized pruritic rash associated with eye pain, sore throat and generalized weakness. Initially the rash was located over his flank now evolved over his back, extremities trunk and the oral mucosa.  Patient was started on allopurinol about 3-4 weeks ago for suspicion of gout arthritis that he has been tolerated well until the onset of the rash. In the ED at the transferring hospital , he was seen by dermatology , a skin biopsy was performed, also received one dose of IV steroids as per dermatology recommendations. (19 Feb 2018 23:26)    PAST MEDICAL & SURGICAL HISTORY:  Diverticulosis  Gastritis  Hyperlipidemia  CVA (cerebral vascular accident)  Anemia in chronic illness  Diabetes mellitus with end-stage renal disease  Benign hypertension with CKD (chronic kidney disease) stage IV  HTN (hypertension)  H/O colonoscopy  H/O endoscopy    MEDICATIONS  (PRN):  acetaminophen   Tablet 650 milliGRAM(s) Oral every 4 hours PRN For Temp greater than 38.5 C (101.3 F)  acetaminophen   Tablet. 650 milliGRAM(s) Oral every 6 hours PRN Mild Pain (1 - 3)  benzocaine 15 mG/menthol 3.6 mG Lozenge 1 Lozenge Oral every 3 hours PRN Sore Throat  bisacodyl 5 milliGRAM(s) Oral daily PRN Constipation  dextrose Gel 1 Dose(s) Oral once PRN Blood Glucose LESS THAN 70 milliGRAM(s)/deciliter  glucagon  Injectable 1 milliGRAM(s) IntraMuscular once PRN Glucose LESS THAN 70 milligrams/deciliter  ondansetron Injectable 4 milliGRAM(s) IV Push every 6 hours PRN Nausea  oxyCODONE    5 mG/acetaminophen 325 mG 1 Tablet(s) Oral every 4 hours PRN Severe Pain (7 - 10)  senna 2 Tablet(s) Oral at bedtime PRN Constipation    Allergies  allopurinol (Rash)  shellfish (Rash)    Intolerances: none listed    CCOx: Pt with h/o diagnosed SJS c/o eye irritation for 1 week; reports itchy eyes with redness and irritation  - Pt reports having rash break our 4 days ago associated with the eye redness and discomfort  - Pt denies flashes, floaters, eye pain OU, denies acute change in vision OD/OS  - Pt is oriented and responds readily    ENRRIQUE: 2 weeks  POHx:  Anatomically narrow angle s/p LPI OU  Proliferative Diabetic retinopathy s/p laser OD  (Pt was scheduled to have laser OS this Monday but instead was admitted to hospitall)    FOHx:  noncontributory    EXAMINATION:    EXTERNAL:  ACUITY:       RIGHT EYE: 20/40sc PH: NI                   LEFT EYE: 20/80sc PH: 20/70+    EXTRAOCULAR MOVEMENTS: FULL OD/OS  PUPILS: PERRL, (-)APD  VFc: grossly full     ANTERIOR SEGMENT:  LIDS/ADENEXA: OD/OS: trace mucoid discharge, 1+ erythema  CONJUNCTIVA/SCLERA: pseudomembrane UL/LL, 1+ injection OU (-)symblepharon  CORNEA: cl, (-)epi defect  ANTERIOR CHAMBER: d/q OU  IRIS/PUPIL: flat, even, (-)NVI, patent PI OD/OS  LENS/VITREOUS: cl OU    INTRAOCULAR PRESSURE:  OD:  16 mmHg                   OS: 17 mmHg @12:00pm         POSTERIOR SEGMENT:  DFE: 1% Tropicamide, 2.5 % Phenylephrine OU  OPTIC NERVE: distinct, pink and healthy OU  MACULA: OD: lipid inferior temporal to macula with MAs, OS: lipid superior to macula with MAs  A/V: 2:3, (+)MAs, hemes OU  FUNDUS/PERIPHERY: flat, (-)h/b/t 360 OU

## 2018-02-22 NOTE — PROGRESS NOTE ADULT - SUBJECTIVE AND OBJECTIVE BOX
Nephrology progress note    Patient is seen and examined, events over the last 24 h noted .    Allergies:  allopurinol (Rash)  shellfish (Rash)    Hospital Medications:   MEDICATIONS  (STANDING):  amLODIPine   Tablet 5 milliGRAM(s) Oral at bedtime  artificial  tears Solution 1 Drop(s) Both EYES every 6 hours  aspirin enteric coated 81 milliGRAM(s) Oral daily  benzocaine 20% Spray 1 Spray(s) Topical three times a day  clopidogrel Tablet 75 milliGRAM(s) Oral daily  dextrose 5% + sodium chloride 0.9%. 1000 milliLiter(s) (75 mL/Hr) IV Continuous <Continuous>  dextrose 5%. 1000 milliLiter(s) (50 mL/Hr) IV Continuous <Continuous>  dextrose 50% Injectable 12.5 Gram(s) IV Push once  dextrose 50% Injectable 25 Gram(s) IV Push once  dextrose 50% Injectable 25 Gram(s) IV Push once  docusate sodium 100 milliGRAM(s) Oral three times a day  ferrous sulfate Oral Tab/Cap - Peds 325 milliGRAM(s) Oral three times a day with meals  FIRST- Mouthwash  BLM 30 milliLiter(s) Swish and Spit every 4 hours  heparin  Injectable 5000 Unit(s) SubCutaneous every 8 hours  immune globulin gamma IVPB 25 Gram(s) IV Intermittent every 24 hours  insulin lispro (HumaLOG) corrective regimen sliding scale   SubCutaneous three times a day before meals  insulin lispro Injectable (HumaLOG). 5 Unit(s) SubCutaneous once  labetalol 200 milliGRAM(s) Oral two times a day  pantoprazole    Tablet 40 milliGRAM(s) Oral before breakfast  sevelamer carbonate Oral Powder - Peds 800 milliGRAM(s) Oral two times a day with meals  sodium bicarbonate 650 milliGRAM(s) Oral every 8 hours        VITALS:  T(F): 96.5 (02-22-18 @ 15:53), Max: 97.4 (02-21-18 @ 21:45)  HR: 72 (02-22-18 @ 15:53)  BP: 163/77 (02-22-18 @ 15:53)  RR: 18 (02-22-18 @ 08:04)  SpO2: 98% (02-21-18 @ 23:25)  Wt(kg): --    02-20 @ 07:01  -  02-21 @ 07:00  --------------------------------------------------------  IN: 975 mL / OUT: 2455 mL / NET: -1480 mL    02-21 @ 07:01 - 02-22 @ 07:00  --------------------------------------------------------  IN: 1475 mL / OUT: 1325 mL / NET: 150 mL    02-22 @ 07:01 - 02-22 @ 17:39  --------------------------------------------------------  IN: 750 mL / OUT: 650 mL / NET: 100 mL          PHYSICAL EXAM:  Constitutional: NAD  HEENT: anicteric sclera, oropharynx clear, MMM  Neck: No JVD  Respiratory: CTAB, no wheezes, rales or rhonchi  Cardiovascular: S1, S2, RRR  Gastrointestinal: BS+, soft, NT/ND  Extremities: No cyanosis or clubbing. No peripheral edema  Neurological: A/O x 3, no focal deficits  : No CVA tenderness. No espinal.   Skin: No rashes  Vascular Access:    LABS:  02-21    139  |  114<H>  |  77<HH>  ----------------------------<  205<H>  4.3   |  17  |  3.4<H>    Creatinine Trend: 3.4<--, 4.0<--    Ca    8.1<L>      21 Feb 2018 17:39  Phos  3.5     02-21  Mg     1.7     02-21    TPro  5.4<L>  /  Alb  2.7<L>  /  TBili  0.4  /  DBili      /  AST  32  /  ALT  38  /  AlkPhos  78  02-21                          10.4   15.24 )-----------( 266      ( 21 Feb 2018 17:39 )             31.4       Urine Studies:      RADIOLOGY & ADDITIONAL STUDIES:

## 2018-02-22 NOTE — PROGRESS NOTE ADULT - SUBJECTIVE AND OBJECTIVE BOX
INTERVAL HISTORY:  Stable o/n. c/o increased oral pain, dysphagia. increased malaise evident. IVIg tx started last pm   Events past 24 hrs***  Vital Signs Last 24 Hrs  T(C): 36.8 (22 Feb 2018 21:33), Max: 36.8 (22 Feb 2018 21:33)  T(F): 98.2 (22 Feb 2018 21:33), Max: 98.2 (22 Feb 2018 21:33)  HR: 75 (22 Feb 2018 21:33) (70 - 87)  BP: 172/79 (22 Feb 2018 21:33) (130/66 - 176/85)  BP(mean): 114 (22 Feb 2018 21:33) (114 - 114)  RR: 18 (22 Feb 2018 08:04) (18 - 18)  SpO2: 100% (22 Feb 2018 21:33) (98% - 100%)    I&O's Summary    21 Feb 2018 07:01  -  22 Feb 2018 07:00  --------------------------------------------------------  IN: 1475 mL / OUT: 1325 mL / NET: 150 mL    22 Feb 2018 07:01  -  22 Feb 2018 21:34  --------------------------------------------------------  IN: 1140 mL / OUT: 1250 mL / NET: -110 mL    Tolerating liquids  Voiding     MEDICATIONS  (STANDING):  amLODIPine   Tablet 10 milliGRAM(s) Oral at bedtime  artificial  tears Solution 1 Drop(s) Both EYES every 6 hours  aspirin enteric coated 81 milliGRAM(s) Oral daily  benzocaine 20% Spray 1 Spray(s) Topical three times a day  clopidogrel Tablet 75 milliGRAM(s) Oral daily  dextrose 5% + sodium chloride 0.9%. 1000 milliLiter(s) (75 mL/Hr) IV Continuous <Continuous>  dextrose 5%. 1000 milliLiter(s) (50 mL/Hr) IV Continuous <Continuous>  dextrose 50% Injectable 12.5 Gram(s) IV Push once  dextrose 50% Injectable 25 Gram(s) IV Push once  dextrose 50% Injectable 25 Gram(s) IV Push once  docusate sodium 100 milliGRAM(s) Oral three times a day  ferrous sulfate Oral Tab/Cap - Peds 325 milliGRAM(s) Oral three times a day with meals  FIRST- Mouthwash  BLM 30 milliLiter(s) Swish and Spit every 4 hours  heparin  Injectable 5000 Unit(s) SubCutaneous every 8 hours  immune globulin gamma IVPB 25 Gram(s) IV Intermittent every 24 hours  insulin lispro (HumaLOG) corrective regimen sliding scale   SubCutaneous three times a day before meals  insulin lispro Injectable (HumaLOG). 5 Unit(s) SubCutaneous once  labetalol 200 milliGRAM(s) Oral two times a day  pantoprazole    Tablet 40 milliGRAM(s) Oral before breakfast  sevelamer carbonate Oral Powder - Peds 800 milliGRAM(s) Oral two times a day with meals  sodium bicarbonate 650 milliGRAM(s) Oral every 8 hours  tobramycin/dexamethasone Suspension 1 Drop(s) Both EYES every 6 hours    MEDICATIONS  (PRN):  acetaminophen   Tablet 650 milliGRAM(s) Oral every 4 hours PRN For Temp greater than 38.5 C (101.3 F)  acetaminophen   Tablet. 650 milliGRAM(s) Oral every 6 hours PRN Mild Pain (1 - 3)  benzocaine 15 mG/menthol 3.6 mG Lozenge 1 Lozenge Oral every 3 hours PRN Sore Throat  bisacodyl 5 milliGRAM(s) Oral daily PRN Constipation  dextrose Gel 1 Dose(s) Oral once PRN Blood Glucose LESS THAN 70 milliGRAM(s)/deciliter  glucagon  Injectable 1 milliGRAM(s) IntraMuscular once PRN Glucose LESS THAN 70 milligrams/deciliter  morphine  - Injectable 2 milliGRAM(s) IV Push every 4 hours PRN Moderate Pain (4 - 6)  ondansetron Injectable 4 milliGRAM(s) IV Push every 6 hours PRN Nausea  oxyCODONE    5 mG/acetaminophen 325 mG 1 Tablet(s) Oral every 4 hours PRN Severe Pain (7 - 10)  senna 2 Tablet(s) Oral at bedtime PRN Constipation    Allergies    allopurinol (Rash)  shellfish (Rash)    Intolerances        Lab Results:                        10.6   10.63 )-----------( 241      ( 22 Feb 2018 16:26 )             32.2     02-22    140  |  115<H>  |  65<HH>  ----------------------------<  93  4.5   |  17  |  3.0<H>    Ca    8.2<L>      22 Feb 2018 16:26  Phos  3.7     02-22  Mg     2.1     02-22    TPro  5.4<L>  /  Alb  2.7<L>  /  TBili  0.4  /  DBili  x   /  AST  32  /  ALT  38  /  AlkPhos  78  02-21    PT/INR - ( 22 Feb 2018 16:26 )   PT: 12.60 sec;   INR: 1.16 ratio         PTT - ( 22 Feb 2018 16:26 )  PTT:51.0 sec    LIVER FUNCTIONS - ( 21 Feb 2018 17:39 )  Alb: 2.7 g/dL / Pro: 5.4 g/dL / ALK PHOS: 78 U/L / ALT: 38 U/L / AST: 32 U/L / GGT: x           CAPILLARY BLOOD GLUCOSE  106 (22 Feb 2018 15:53)  91 (22 Feb 2018 14:47)  71 (22 Feb 2018 13:32)  61 (22 Feb 2018 13:05)  110 (22 Feb 2018 08:29)  53 (22 Feb 2018 08:04)            PHYSICAL EXAM:    General/Neuro: awake alert, less interaction     HEENT: increased facial erythema, lip ulceration, drooling, sclera injected    Chest: clear BS     Cardiovascular: reg    Abdomen: soft     Skin/Wounds: rash with slightly increased erythema abdomen, chest and  palms;   small open wounds torso.                      increased tenderness     GENERAL:   Continue pain mgmt, VTE and GI prophylaxis  Cont OT/ PT, splinting/ROM, elevation         Total Critical Care time spent by the attending physician is [] minutes, excluding procedure time.

## 2018-02-22 NOTE — PROGRESS NOTE ADULT - ASSESSMENT
ASSESSMENT/ PLAN:  CONDITION : Guarded  TENS-50% involvement; <1% open  Continue IVIg  Continue local wound care  Oral ulceration and dysphagia- FT placed after long discussion with patient and son- begin supplemental TF   CKD- continue Sevalamir, judicious hydration  Pain mgmt, VTE and GI prophylaxis   - f/u cxs   Ophthalmology evaluation and rec noted. Eyedrops ordered.

## 2018-02-22 NOTE — PROGRESS NOTE ADULT - ASSESSMENT
63 yo man with PMH CKD 4 , HTN and Dm presenting for a transfer from [facility] for SJS  ·	CKD 4 stable  - creat is trending down  ·	creat has been around 4, was told he may need HD soon  ·	No need for RRT for now  ·	on NaHCO3 po continue  650 mg q8h  ·	on IVF (poor po intake)  continue follow strict I and O  ·	Repeat UA and check Urine prot/ creat ratio  ·	CKD MBD , hyperphosphatemia on  sevelamer continue and increase to 1 tab po q8h check PTH  ·	HTN - increase Amlodipine to 10 mg qd, cont LAbetalol 200 q 12  ·	SJS on dexamthasone no antibiotics     will follow

## 2018-02-22 NOTE — CONSULT NOTE ADULT - ASSESSMENT
1. SJS with associated pseudomembranous conjunctivitis  - pseudomembranes peeled in office today without adverse ocular sequelae  START Tobradex oph sol q6H OU  START non-preserved artifical tears q6H OU  - will f/u with Pt in 3 days  2. Non proliferative Diabetic retinopathy with likely clinically significant macular edema OU s/p focal laser OD  - Pt seen with retinal specialist Dr. Kendall today  - no urgent retinal care needed at this time  - Pt to f/u with current eye care provider (in [place]) as an outpatient.  3. Anatomically Narrow Angle s/p LPI OU  - stable IOP and anterior chamber  - monitor

## 2018-02-23 LAB
ANION GAP SERPL CALC-SCNC: 6 MMOL/L — LOW (ref 7–14)
APPEARANCE UR: CLEAR — SIGNIFICANT CHANGE UP
BILIRUB UR-MCNC: NEGATIVE — SIGNIFICANT CHANGE UP
BLD GP AB SCN SERPL QL: SIGNIFICANT CHANGE UP
BUN SERPL-MCNC: 54 MG/DL — HIGH (ref 10–20)
CALCIUM SERPL-MCNC: 8 MG/DL — LOW (ref 8.5–10.1)
CALCIUM SERPL-MCNC: 8.1 MG/DL — LOW (ref 8.4–10.5)
CHLORIDE SERPL-SCNC: 112 MMOL/L — HIGH (ref 98–110)
CO2 SERPL-SCNC: 21 MMOL/L — SIGNIFICANT CHANGE UP (ref 17–32)
COLOR SPEC: YELLOW — SIGNIFICANT CHANGE UP
CREAT ?TM UR-MCNC: 66 MG/DL — SIGNIFICANT CHANGE UP
CREAT SERPL-MCNC: 2.7 MG/DL — HIGH (ref 0.7–1.5)
DIFF PNL FLD: (no result)
GLUCOSE SERPL-MCNC: 221 MG/DL — HIGH (ref 70–110)
GLUCOSE UR QL: 100 MG/DL
HCT VFR BLD CALC: 31.2 % — LOW (ref 42–52)
HGB BLD-MCNC: 10.1 G/DL — LOW (ref 14–18)
KETONES UR-MCNC: NEGATIVE — SIGNIFICANT CHANGE UP
LEUKOCYTE ESTERASE UR-ACNC: NEGATIVE — SIGNIFICANT CHANGE UP
MAGNESIUM SERPL-MCNC: 1.8 MG/DL — SIGNIFICANT CHANGE UP (ref 1.8–2.4)
MCHC RBC-ENTMCNC: 27.9 PG — SIGNIFICANT CHANGE UP (ref 27–31)
MCHC RBC-ENTMCNC: 32.4 G/DL — SIGNIFICANT CHANGE UP (ref 32–37)
MCV RBC AUTO: 86.2 FL — SIGNIFICANT CHANGE UP (ref 80–94)
NITRITE UR-MCNC: NEGATIVE — SIGNIFICANT CHANGE UP
NRBC # BLD: 0 /100 WBCS — SIGNIFICANT CHANGE UP (ref 0–0)
PH UR: 6 — SIGNIFICANT CHANGE UP (ref 5–8)
PHOSPHATE SERPL-MCNC: 3.3 MG/DL — SIGNIFICANT CHANGE UP (ref 2.1–4.9)
PLATELET # BLD AUTO: 223 K/UL — SIGNIFICANT CHANGE UP (ref 130–400)
POTASSIUM SERPL-MCNC: 4.9 MMOL/L — SIGNIFICANT CHANGE UP (ref 3.5–5)
POTASSIUM SERPL-SCNC: 4.9 MMOL/L — SIGNIFICANT CHANGE UP (ref 3.5–5)
PROT ?TM UR-MCNC: 216 MG/DL — SIGNIFICANT CHANGE UP
PROT UR-MCNC: >=300 MG/DL
PROT/CREAT UR-RTO: 3.3 RATIO — HIGH (ref 0–0.2)
PTH-INTACT FLD-MCNC: 89 PG/ML — HIGH (ref 15–65)
RBC # BLD: 3.62 M/UL — LOW (ref 4.7–6.1)
RBC # FLD: 15.6 % — HIGH (ref 11.5–14.5)
RBC CASTS # UR COMP ASSIST: SIGNIFICANT CHANGE UP /HPF
SODIUM SERPL-SCNC: 139 MMOL/L — SIGNIFICANT CHANGE UP (ref 135–146)
SP GR SPEC: 1.01 — SIGNIFICANT CHANGE UP (ref 1.01–1.03)
TYPE + AB SCN PNL BLD: SIGNIFICANT CHANGE UP
UROBILINOGEN FLD QL: 0.2 MG/DL — SIGNIFICANT CHANGE UP (ref 0.2–0.2)
WBC # BLD: 9.27 K/UL — SIGNIFICANT CHANGE UP (ref 4.8–10.8)
WBC # FLD AUTO: 9.27 K/UL — SIGNIFICANT CHANGE UP (ref 4.8–10.8)

## 2018-02-23 RX ORDER — ASCORBIC ACID 60 MG
TABLET,CHEWABLE ORAL
Qty: 0 | Refills: 0 | Status: DISCONTINUED | OUTPATIENT
Start: 2018-02-23 | End: 2018-02-23

## 2018-02-23 RX ORDER — SALICYLIC ACID 0.5 %
1 CLEANSER (GRAM) TOPICAL EVERY 4 HOURS
Qty: 0 | Refills: 0 | Status: DISCONTINUED | OUTPATIENT
Start: 2018-02-23 | End: 2018-03-01

## 2018-02-23 RX ORDER — ASCORBIC ACID 60 MG
500 TABLET,CHEWABLE ORAL DAILY
Qty: 0 | Refills: 0 | Status: DISCONTINUED | OUTPATIENT
Start: 2018-02-24 | End: 2018-03-01

## 2018-02-23 RX ORDER — ASCORBIC ACID 60 MG
TABLET,CHEWABLE ORAL
Qty: 0 | Refills: 0 | Status: DISCONTINUED | OUTPATIENT
Start: 2018-02-24 | End: 2018-03-01

## 2018-02-23 RX ORDER — ASCORBIC ACID 60 MG
500 TABLET,CHEWABLE ORAL DAILY
Qty: 0 | Refills: 0 | Status: DISCONTINUED | OUTPATIENT
Start: 2018-02-23 | End: 2018-02-23

## 2018-02-23 RX ORDER — TOBRAMYCIN AND DEXAMETHASONE 1; 3 MG/ML; MG/ML
1 SUSPENSION/ DROPS OPHTHALMIC EVERY 6 HOURS
Qty: 0 | Refills: 0 | Status: DISCONTINUED | OUTPATIENT
Start: 2018-02-23 | End: 2018-03-01

## 2018-02-23 RX ORDER — ZINC SULFATE TAB 220 MG (50 MG ZINC EQUIVALENT) 220 (50 ZN) MG
220 TAB ORAL DAILY
Qty: 0 | Refills: 0 | Status: DISCONTINUED | OUTPATIENT
Start: 2018-02-23 | End: 2018-03-01

## 2018-02-23 RX ORDER — SEVELAMER CARBONATE 2400 MG/1
800 POWDER, FOR SUSPENSION ORAL
Qty: 0 | Refills: 0 | Status: DISCONTINUED | OUTPATIENT
Start: 2018-02-23 | End: 2018-02-24

## 2018-02-23 RX ORDER — SODIUM CHLORIDE 9 MG/ML
1000 INJECTION INTRAMUSCULAR; INTRAVENOUS; SUBCUTANEOUS
Qty: 0 | Refills: 0 | Status: DISCONTINUED | OUTPATIENT
Start: 2018-02-23 | End: 2018-02-24

## 2018-02-23 RX ORDER — ASCORBIC ACID 60 MG
500 TABLET,CHEWABLE ORAL ONCE
Qty: 0 | Refills: 0 | Status: DISCONTINUED | OUTPATIENT
Start: 2018-02-23 | End: 2018-02-23

## 2018-02-23 RX ORDER — PANTOPRAZOLE SODIUM 20 MG/1
40 TABLET, DELAYED RELEASE ORAL DAILY
Qty: 0 | Refills: 0 | Status: DISCONTINUED | OUTPATIENT
Start: 2018-02-23 | End: 2018-03-01

## 2018-02-23 RX ORDER — ASCORBIC ACID 60 MG
500 TABLET,CHEWABLE ORAL ONCE
Qty: 0 | Refills: 0 | Status: COMPLETED | OUTPATIENT
Start: 2018-02-23 | End: 2018-02-24

## 2018-02-23 RX ADMIN — Medication 200 MILLIGRAM(S): at 05:48

## 2018-02-23 RX ADMIN — HEPARIN SODIUM 5000 UNIT(S): 5000 INJECTION INTRAVENOUS; SUBCUTANEOUS at 13:14

## 2018-02-23 RX ADMIN — MORPHINE SULFATE 2 MILLIGRAM(S): 50 CAPSULE, EXTENDED RELEASE ORAL at 19:33

## 2018-02-23 RX ADMIN — Medication 1 DROP(S): at 11:52

## 2018-02-23 RX ADMIN — Medication 325 MILLIGRAM(S): at 11:58

## 2018-02-23 RX ADMIN — DIPHENHYDRAMINE HYDROCHLORIDE AND LIDOCAINE HYDROCHLORIDE AND ALUMINUM HYDROXIDE AND MAGNESIUM HYDRO 30 MILLILITER(S): KIT at 17:32

## 2018-02-23 RX ADMIN — CLOPIDOGREL BISULFATE 75 MILLIGRAM(S): 75 TABLET, FILM COATED ORAL at 11:56

## 2018-02-23 RX ADMIN — Medication 1 DROP(S): at 17:32

## 2018-02-23 RX ADMIN — Medication 1 APPLICATION(S): at 22:41

## 2018-02-23 RX ADMIN — TOBRAMYCIN AND DEXAMETHASONE 1 APPLICATION(S): 1; 3 SUSPENSION/ DROPS OPHTHALMIC at 11:59

## 2018-02-23 RX ADMIN — Medication 325 MILLIGRAM(S): at 17:31

## 2018-02-23 RX ADMIN — Medication 1 APPLICATION(S): at 13:33

## 2018-02-23 RX ADMIN — Medication 100 MILLIGRAM(S): at 05:47

## 2018-02-23 RX ADMIN — Medication 1 SPRAY(S): at 22:41

## 2018-02-23 RX ADMIN — Medication 100 MILLIGRAM(S): at 22:41

## 2018-02-23 RX ADMIN — Medication 6: at 17:30

## 2018-02-23 RX ADMIN — Medication 1 APPLICATION(S): at 17:34

## 2018-02-23 RX ADMIN — HEPARIN SODIUM 5000 UNIT(S): 5000 INJECTION INTRAVENOUS; SUBCUTANEOUS at 05:48

## 2018-02-23 RX ADMIN — Medication 81 MILLIGRAM(S): at 11:55

## 2018-02-23 RX ADMIN — DIPHENHYDRAMINE HYDROCHLORIDE AND LIDOCAINE HYDROCHLORIDE AND ALUMINUM HYDROXIDE AND MAGNESIUM HYDRO 30 MILLILITER(S): KIT at 11:50

## 2018-02-23 RX ADMIN — Medication 650 MILLIGRAM(S): at 05:49

## 2018-02-23 RX ADMIN — DIPHENHYDRAMINE HYDROCHLORIDE AND LIDOCAINE HYDROCHLORIDE AND ALUMINUM HYDROXIDE AND MAGNESIUM HYDRO 30 MILLILITER(S): KIT at 21:09

## 2018-02-23 RX ADMIN — Medication 200 MILLIGRAM(S): at 17:32

## 2018-02-23 RX ADMIN — TOBRAMYCIN AND DEXAMETHASONE 1 APPLICATION(S): 1; 3 SUSPENSION/ DROPS OPHTHALMIC at 17:34

## 2018-02-23 RX ADMIN — Medication 1 DROP(S): at 05:47

## 2018-02-23 RX ADMIN — MORPHINE SULFATE 2 MILLIGRAM(S): 50 CAPSULE, EXTENDED RELEASE ORAL at 08:55

## 2018-02-23 RX ADMIN — TOBRAMYCIN AND DEXAMETHASONE 1 APPLICATION(S): 1; 3 SUSPENSION/ DROPS OPHTHALMIC at 05:49

## 2018-02-23 RX ADMIN — AMLODIPINE BESYLATE 10 MILLIGRAM(S): 2.5 TABLET ORAL at 21:08

## 2018-02-23 RX ADMIN — PANTOPRAZOLE SODIUM 40 MILLIGRAM(S): 20 TABLET, DELAYED RELEASE ORAL at 11:52

## 2018-02-23 RX ADMIN — Medication 650 MILLIGRAM(S): at 21:07

## 2018-02-23 RX ADMIN — Medication 100 MILLIGRAM(S): at 13:14

## 2018-02-23 RX ADMIN — Medication 2: at 12:02

## 2018-02-23 RX ADMIN — DIPHENHYDRAMINE HYDROCHLORIDE AND LIDOCAINE HYDROCHLORIDE AND ALUMINUM HYDROXIDE AND MAGNESIUM HYDRO 30 MILLILITER(S): KIT at 13:15

## 2018-02-23 RX ADMIN — HEPARIN SODIUM 5000 UNIT(S): 5000 INJECTION INTRAVENOUS; SUBCUTANEOUS at 21:08

## 2018-02-23 RX ADMIN — Medication 325 MILLIGRAM(S): at 09:47

## 2018-02-23 RX ADMIN — ZINC SULFATE TAB 220 MG (50 MG ZINC EQUIVALENT) 220 MILLIGRAM(S): 220 (50 ZN) TAB at 21:06

## 2018-02-23 RX ADMIN — Medication 650 MILLIGRAM(S): at 14:29

## 2018-02-23 RX ADMIN — SEVELAMER CARBONATE 800 MILLIGRAM(S): 2400 POWDER, FOR SUSPENSION ORAL at 17:31

## 2018-02-23 RX ADMIN — SEVELAMER CARBONATE 800 MILLIGRAM(S): 2400 POWDER, FOR SUSPENSION ORAL at 11:49

## 2018-02-23 NOTE — PROGRESS NOTE ADULT - SUBJECTIVE AND OBJECTIVE BOX
Nephrology progress note    Patient is seen and examined, events over the last 24 h noted .    Allergies:  allopurinol (Rash)  shellfish (Rash)    Hospital Medications:   MEDICATIONS  (STANDING):  amLODIPine   Tablet 10 milliGRAM(s) Oral at bedtime  artificial  tears Solution 1 Drop(s) Both EYES every 6 hours  aspirin enteric coated 81 milliGRAM(s) Oral daily  benzocaine 20% Spray 1 Spray(s) Topical three times a day  clopidogrel Tablet 75 milliGRAM(s) Oral daily  docusate sodium 100 milliGRAM(s) Oral three times a day  ferrous sulfate Oral Tab/Cap - Peds 325 milliGRAM(s) Oral three times a day with meals  FIRST- Mouthwash  BLM 30 milliLiter(s) Swish and Spit every 4 hours  heparin  Injectable 5000 Unit(s) SubCutaneous every 8 hours  immune globulin gamma IVPB 25 Gram(s) IV Intermittent every 24 hours  insulin lispro (HumaLOG) corrective regimen sliding scale   SubCutaneous three times a day before meals  insulin lispro Injectable (HumaLOG). 5 Unit(s) SubCutaneous once  labetalol 200 milliGRAM(s) Oral two times a day  pantoprazole    Tablet 40 milliGRAM(s) Oral before breakfast  sevelamer carbonate Oral Powder - Peds 800 milliGRAM(s) Oral two times a day with meals  sodium bicarbonate 650 milliGRAM(s) Oral every 8 hours  tobramycin/dexamethasone Ointment 1 Application(s) Both EYES every 6 hours        VITALS:  T(F): 96.7 (18 @ 07:55), Max: 98.2 (18 @ 21:33)  HR: 82 (18 @ 07:55)  BP: 150/73 (18 @ 07:55)  RR: 18 (18 @ 07:55)  SpO2: 99% (18 @ 07:55)  Wt(kg): --     @ 07:01  -   @ 07:00  --------------------------------------------------------  IN: 1475 mL / OUT: 1325 mL / NET: 150 mL     @ 07: @ 07:00  --------------------------------------------------------  IN: 2730 mL / OUT: 2780 mL / NET: -50 mL          PHYSICAL EXAM:  Constitutional: NAD  HEENT: anicteric sclera, oropharynx clear, MMM  Neck: No JVD  Respiratory: CTAB, no wheezes, rales or rhonchi  Cardiovascular: S1, S2, RRR  Gastrointestinal: BS+, soft, NT/ND  Extremities: No cyanosis or clubbing. No peripheral edema  :  No espinal.   Skin: No rashes    LABS:      140  |  115<H>  |  65<HH>  ----------------------------<  93  4.5   |  17  |  3.0<H>    Ca    8.2<L>      2018 16:26  Phos  3.7       Mg     2.1         TPro  5.4<L>  /  Alb  2.7<L>  /  TBili  0.4  /  DBili      /  AST  32  /  ALT  38  /  AlkPhos  78                            10.6   10.63 )-----------( 241      ( 2018 16:26 )             32.2       Urine Studies:  Urinalysis Basic - ( 2018 00:56 )    Color: Yellow / Appearance: Clear / S.015 / pH:   Gluc:  / Ketone: Negative  / Bili: Negative / Urobili: 0.2 mg/dL   Blood:  / Protein: >=300 mg/dL / Nitrite: Negative   Leuk Esterase: Negative / RBC: 1-2 /HPF / WBC    Sq Epi:  / Non Sq Epi:  / Bacteria:         RADIOLOGY & ADDITIONAL STUDIES:

## 2018-02-23 NOTE — PROGRESS NOTE ADULT - ASSESSMENT
ASSESSMENT/ PLAN  guarded condition  TENS- 50-60% involvement . Continue local wound care, pain mgmt  Continue IVIg,   Continue IV hydration  CKD- stable. Discussed with nephrology today. Continue current regimen. No HD  Psych: pt depressed by clinical condition. Disc with daughter. Pt agrees to Pscych  consult  GI/ Nutrition- poor po intake due to dysphagia and oral ulceration. Continue TF suppl.  Dm- controlled; continue monitoring and regimen  Continue VTE / GI prohphylaxis.  Continue psychosocial support.

## 2018-02-23 NOTE — CONSULT NOTE ADULT - SUBJECTIVE AND OBJECTIVE BOX
HPI:  61M with h/o HTN,HLD,DM2,CKD, gastritis , CVA , arthritis admitted to the Burn Unit for Flowers Kurt syndrome , a transfer from an outside hospital.  As per the transfer note and the patient’s son account, his sx started a few days ago with a generalized pruritic rash associated with eye pain, sore throat and generalized weakness. Initially the rash was located over his flank now evolved over his back, extremities trunk and the oral mucosa.  Patient was started on allopurinol about 3-4 weeks ago for suspicion of gout arthritis that he has been tolerated well until the onset of the rash. In the ED at the transferring hospital, he was seen by dermatology, a skin biopsy was performed, also received one dose of IV steroids as per dermatology recommendations.     TENS 50-60%. On IVIG, 2 doses given, 5 total doses planned.   Since admission PO diet started but 2/22 unable to tolerate any PO due to open oral lesions and NGT placed for feeding/meds    PAST MEDICAL & SURGICAL HISTORY:  Diverticulosis  Gastritis  Hyperlipidemia  CVA (cerebral vascular accident)  Anemia in chronic illness  Diabetes mellitus with end-stage renal disease  Benign hypertension with CKD (chronic kidney disease) stage IV  HTN (hypertension)    MEDICATIONS  (STANDING):  amLODIPine   Tablet 10 milliGRAM(s) Oral at bedtime  artificial  tears Solution 1 Drop(s) Both EYES every 6 hours  aspirin enteric coated 81 milliGRAM(s) Oral daily  benzocaine 20% Spray 1 Spray(s) Topical three times a day  clopidogrel Tablet 75 milliGRAM(s) Oral daily  dextrose 5% + sodium chloride 0.9%. 1000 milliLiter(s) (75 mL/Hr) IV Continuous <Continuous>  dextrose 5%. 1000 milliLiter(s) (50 mL/Hr) IV Continuous <Continuous>  dextrose 50% Injectable 12.5 Gram(s) IV Push once  dextrose 50% Injectable 25 Gram(s) IV Push once  dextrose 50% Injectable 25 Gram(s) IV Push once  docusate sodium 100 milliGRAM(s) Oral three times a day  ferrous sulfate Oral Tab/Cap - Peds 325 milliGRAM(s) Oral three times a day with meals  FIRST- Mouthwash  BLM 30 milliLiter(s) Swish and Spit every 4 hours  heparin  Injectable 5000 Unit(s) SubCutaneous every 8 hours  immune globulin gamma IVPB 25 Gram(s) IV Intermittent every 24 hours  insulin lispro (HumaLOG) corrective regimen sliding scale   SubCutaneous three times a day before meals  insulin lispro Injectable (HumaLOG). 5 Unit(s) SubCutaneous once  labetalol 200 milliGRAM(s) Oral two times a day  pantoprazole    Tablet 40 milliGRAM(s) Oral before breakfast  pantoprazole   Suspension 40 milliGRAM(s) Oral daily  sevelamer carbonate Oral Powder - Peds 800 milliGRAM(s) Oral three times a day with meals  sodium bicarbonate 650 milliGRAM(s) Oral every 8 hours  tobramycin/dexamethasone Ointment 1 Application(s) Both EYES every 6 hours  vitamin A &amp; D Ointment 1 Application(s) Topical every 4 hours  Allergies:  allopurinol (Rash)  shellfish (Rash)    Diet: NPO  NGT- Glucerna 1.2 360ml 4X/day  +BM 2/22    ICU Vital Signs Last 24 Hrs  T(F): 96.7 (23 Feb 2018 07:55), Max: 98.2 (22 Feb 2018 21:33)  HR: 82 (23 Feb 2018 07:55) (72 - 89)  BP: 150/73 (23 Feb 2018 07:55) (150/73 - 176/85)  BP(mean): 112 (23 Feb 2018 05:05) (110 - 114)  RR: 18 (23 Feb 2018 07:55) (18 - 18)  SpO2: 98% (23 Feb 2018 11:42) (98% - 100%)  I&O's Detail    22 Feb 2018 07:01  -  23 Feb 2018 07:00  --------------------------------------------------------  IN:    dextrose 5% + sodium chloride 0.9%.: 800 mL    Enteral Tube Flush: 150 mL    Glucerna: 1080 mL    IV PiggyBack: 250 mL    sodium chloride 0.9%: 450 mL  Total IN: 2730 mL    OUT:    Voided: 2780 mL  Total OUT: 2780 mL    Total NET: -50 mL    02-22    140  |  115<H>  |  65<HH>  ----------------------------<  93  4.5   |  17  |  3.0<H>    Ca    8.2<L>      22 Feb 2018 16:26  Phos  3.7     02-22  Mg     2.1     02-22    TPro  5.4<L>  /  Alb  2.7<L>  /  TBili  0.4  /  DBili  x   /  AST  32  /  ALT  38  /  AlkPhos  78  02-21                        10.6   10.63 )-----------( 241      ( 22 Feb 2018 16:26 )             32.2   CAPILLARY BLOOD GLUCOSE  190 (23 Feb 2018 11:42)  216 (23 Feb 2018 08:19)  141 (22 Feb 2018 22:03)  106 (22 Feb 2018 15:53)    Height: 5'3"  Weight: 58kg    Awake, alert & currently comfortable  Daughter at bedside and translating  Abd soft, ND  NGT in place  Skin: generalized rash  Oral mucosa blood crusted & open    Tolerating NG feeds well while NPO due to oral lesions and inability to tolerate PO. Current feeds provide 1710 kcals, 85.2g protein.   Needs increased protein intake. Add Miguel Angel 1 pack q12hrs via ngt.      Electronic Signatures:  Tatiana Valencia (Registered Dietitian)  (Signed 23-Feb-2018 15:01)  	Authored: Note Type, Note

## 2018-02-23 NOTE — CONSULT NOTE ADULT - ASSESSMENT
pt seen and evaluated. Above note appreciated. Meds and labs reviewed.  Mouth and lips quite painful. pt drinking small amts of water, but intake inadequate.     IMP:  Flowers-Kurt Syndrome  inadequate po intake/ inability to eat due to oral and karina-oral lesions  DM  CKD  anemia    SUGGEST:  as noted above:   - continue Glucerna tube feeds 360 ml x 3 post-prandial feedings per day   - add Miguel Angel in 4 oz water via NG twice a day, preferable before lunch and dinner feed   - add zinc 220 mg and vitamin C 500 mg via ng once / day each x 7 days, then re-evaluate pt seen and evaluated. Above note appreciated. Meds and labs reviewed.  Mouth and lips quite painful. pt drinking small amts of water, but intake inadequate.     IMP:  Flowers-Kurt Syndrome  inadequate po intake/ inability to eat due to oral and karina-oral lesions  DM  CKD  anemia    SUGGEST:  as noted above:   - continue Glucerna tube feeds 360 ml x 4 post-prandial (pc and hs, if anything po permitted) feedings per day   - add Miguel Angel in 4 oz water via NG twice a day, preferable before lunch and dinner feed   - add zinc 220 mg and vitamin C 500 mg via ng once / day each x 7 days, then re-evaluate

## 2018-02-23 NOTE — PROGRESS NOTE ADULT - ASSESSMENT
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

## 2018-02-23 NOTE — CHART NOTE - NSCHARTNOTEFT_GEN_A_CORE
HPI:  61M with h/o HTN,HLD,DM2,CKD, gastritis , CVA , arthritis admitted to the Burn Unit for Flowers Kurt syndrome , a transfer from an outside hospital.  As per the transfer note and the patient’s son account, his sx started a few days ago with a generalized pruritic rash associated with eye pain, sore throat and generalized weakness. Initially the rash was located over his flank now evolved over his back, extremities trunk and the oral mucosa.  Patient was started on allopurinol about 3-4 weeks ago for suspicion of gout arthritis that he has been tolerated well until the onset of the rash. In the ED at the transferring hospital, he was seen by dermatology, a skin biopsy was performed, also received one dose of IV steroids as per dermatology recommendations.     TENS 50-60%. On IVIG, 2 doses given, 5 total doses planned.   Since admission PO diet started but 2/22 unable to tolerate any PO due to open oral lesions and NGT placed for feeding/meds    PAST MEDICAL & SURGICAL HISTORY:  Diverticulosis  Gastritis  Hyperlipidemia  CVA (cerebral vascular accident)  Anemia in chronic illness  Diabetes mellitus with end-stage renal disease  Benign hypertension with CKD (chronic kidney disease) stage IV  HTN (hypertension)  H/O colonoscopy  H/O endoscopy    MEDICATIONS  (STANDING):  amLODIPine   Tablet 10 milliGRAM(s) Oral at bedtime  artificial  tears Solution 1 Drop(s) Both EYES every 6 hours  aspirin enteric coated 81 milliGRAM(s) Oral daily  benzocaine 20% Spray 1 Spray(s) Topical three times a day  clopidogrel Tablet 75 milliGRAM(s) Oral daily  dextrose 5% + sodium chloride 0.9%. 1000 milliLiter(s) (75 mL/Hr) IV Continuous <Continuous>  dextrose 5%. 1000 milliLiter(s) (50 mL/Hr) IV Continuous <Continuous>  dextrose 50% Injectable 12.5 Gram(s) IV Push once  dextrose 50% Injectable 25 Gram(s) IV Push once  dextrose 50% Injectable 25 Gram(s) IV Push once  docusate sodium 100 milliGRAM(s) Oral three times a day  ferrous sulfate Oral Tab/Cap - Peds 325 milliGRAM(s) Oral three times a day with meals  FIRST- Mouthwash  BLM 30 milliLiter(s) Swish and Spit every 4 hours  heparin  Injectable 5000 Unit(s) SubCutaneous every 8 hours  immune globulin gamma IVPB 25 Gram(s) IV Intermittent every 24 hours  insulin lispro (HumaLOG) corrective regimen sliding scale   SubCutaneous three times a day before meals  insulin lispro Injectable (HumaLOG). 5 Unit(s) SubCutaneous once  labetalol 200 milliGRAM(s) Oral two times a day  pantoprazole    Tablet 40 milliGRAM(s) Oral before breakfast  pantoprazole   Suspension 40 milliGRAM(s) Oral daily  sevelamer carbonate Oral Powder - Peds 800 milliGRAM(s) Oral three times a day with meals  sodium bicarbonate 650 milliGRAM(s) Oral every 8 hours  tobramycin/dexamethasone Ointment 1 Application(s) Both EYES every 6 hours  vitamin A &amp; D Ointment 1 Application(s) Topical every 4 hours    Allergies:  allopurinol (Rash)  shellfish (Rash)    Diet: NPO  NGT- Glucerna 1.2 360ml 4X/day  +BM 2/22    ICU Vital Signs Last 24 Hrs  T(F): 96.7 (23 Feb 2018 07:55), Max: 98.2 (22 Feb 2018 21:33)  HR: 82 (23 Feb 2018 07:55) (72 - 89)  BP: 150/73 (23 Feb 2018 07:55) (150/73 - 176/85)  BP(mean): 112 (23 Feb 2018 05:05) (110 - 114)  RR: 18 (23 Feb 2018 07:55) (18 - 18)  SpO2: 98% (23 Feb 2018 11:42) (98% - 100%)    I&O's Detail    22 Feb 2018 07:01  -  23 Feb 2018 07:00  --------------------------------------------------------  IN:    dextrose 5% + sodium chloride 0.9%.: 800 mL    Enteral Tube Flush: 150 mL    Glucerna: 1080 mL    IV PiggyBack: 250 mL    sodium chloride 0.9%: 450 mL  Total IN: 2730 mL    OUT:    Voided: 2780 mL  Total OUT: 2780 mL    Total NET: -50 mL    02-22    140  |  115<H>  |  65<HH>  ----------------------------<  93  4.5   |  17  |  3.0<H>    Ca    8.2<L>      22 Feb 2018 16:26  Phos  3.7     02-22  Mg     2.1     02-22    TPro  5.4<L>  /  Alb  2.7<L>  /  TBili  0.4  /  DBili  x   /  AST  32  /  ALT  38  /  AlkPhos  78  02-21                        10.6   10.63 )-----------( 241      ( 22 Feb 2018 16:26 )             32.2       CAPILLARY BLOOD GLUCOSE  190 (23 Feb 2018 11:42)  216 (23 Feb 2018 08:19)  141 (22 Feb 2018 22:03)  106 (22 Feb 2018 15:53)    Height: 5'3"  Weight: 58kg    Awake, alert & currently comfortable  Daughter at bedside and translating  Abd soft, ND  NGT in place  Skin: generalized rash  Oral mucosa blood crusted & open    Tolerating NG feeds well while NPO due to oral lesions and inability to tolerate PO. Current feeds provide 1710 kcals, 85.2g protein.   Needs increased protein intake. Add Miguel Angel 1 pack q12hrs via ngt.

## 2018-02-23 NOTE — PROGRESS NOTE ADULT - SUBJECTIVE AND OBJECTIVE BOX
INTERVAL HISTORY:  Events past 24 hrs***  o/n started TF. Pt states he does not feel well. Reportedly expressed desire to die. Feels "better" now that daughter is at bedside  Vital Signs Last 24 Hrs  T(C): 35.9 (23 Feb 2018 07:55), Max: 36.8 (22 Feb 2018 21:33)  T(F): 96.7 (23 Feb 2018 07:55), Max: 98.2 (22 Feb 2018 21:33)  HR: 82 (23 Feb 2018 07:55) (72 - 89)  BP: 150/73 (23 Feb 2018 07:55) (150/73 - 176/85)  BP(mean): 112 (23 Feb 2018 05:05) (110 - 114)  RR: 18 (23 Feb 2018 07:55) (18 - 18)  SpO2: 99% (23 Feb 2018 07:55) (98% - 100%)      22 Feb 2018 07:01  -  23 Feb 2018 07:00  --------------------------------------------------------  IN: 2730 mL / OUT: 2780 mL / NET: -50 mL    FS 53- 141    IVF : NS @ 75cc/hr  Voiding       Allergies    allopurinol (Rash)  shellfish (Rash)            Lab Results:                        10.6   10.63 )-----------( 241      ( 22 Feb 2018 16:26 )             32.2     02-22    140  |  115<H>  |  65<HH>  ----------------------------<  93  4.5   |  17  |  3.0<H>    Ca    8.2<L>      22 Feb 2018 16:26  Phos  3.7     02-22  Mg     2.1     02-22    TPro  5.4<L>  /  Alb  2.7<L>  /  TBili  0.4  /  DBili  x   /  AST  32  /  ALT  38  /  AlkPhos  78  02-21    PT/INR - ( 22 Feb 2018 16:26 )   PT: 12.60 sec;   INR: 1.16 ratio       MICROBIOLOGY:  02-20 @ 06:00  Culture-urine --  Culture results   Numerous Escherichia coli  Numerous Escherichia coli #2  Numerous Coag Negative Staphylococcus  Numerous Lactococcus garvieae  method type SHAWN  Organism Escherichia coli  Organism Identification Escherichia coli  Escherichia coli  Specimen source .Other Abdomen wound    IMAGING STUDIES:       PHYSICAL EXAM:     General/Neuro: Pt awake OOB in chair    HEENT: bloody crusted lips    Chest: clear ant    Cardiovascular: reg      Skin/Wounds: generalized rash less on extremities and upper back ; tender palms and soles, small open areas chest and abdomen.

## 2018-02-24 DIAGNOSIS — F43.29 ADJUSTMENT DISORDER WITH OTHER SYMPTOMS: ICD-10-CM

## 2018-02-24 DIAGNOSIS — F43.23 ADJUSTMENT DISORDER WITH MIXED ANXIETY AND DEPRESSED MOOD: ICD-10-CM

## 2018-02-24 DIAGNOSIS — F05 DELIRIUM DUE TO KNOWN PHYSIOLOGICAL CONDITION: ICD-10-CM

## 2018-02-24 LAB
ALBUMIN SERPL ELPH-MCNC: 2.1 G/DL — LOW (ref 3–5.5)
ALP SERPL-CCNC: 74 U/L — SIGNIFICANT CHANGE UP (ref 30–115)
ALT FLD-CCNC: 19 U/L — SIGNIFICANT CHANGE UP (ref 0–41)
ANION GAP SERPL CALC-SCNC: 5 MMOL/L — LOW (ref 7–14)
AST SERPL-CCNC: 27 U/L — SIGNIFICANT CHANGE UP (ref 0–41)
BILIRUB SERPL-MCNC: 0.5 MG/DL — SIGNIFICANT CHANGE UP (ref 0.2–1.2)
BUN SERPL-MCNC: 51 MG/DL — HIGH (ref 10–20)
CALCIUM SERPL-MCNC: 8.1 MG/DL — LOW (ref 8.4–10.5)
CALCIUM SERPL-MCNC: 8.1 MG/DL — LOW (ref 8.5–10.1)
CHLORIDE SERPL-SCNC: 114 MMOL/L — HIGH (ref 98–110)
CO2 SERPL-SCNC: 21 MMOL/L — SIGNIFICANT CHANGE UP (ref 17–32)
CREAT SERPL-MCNC: 2.6 MG/DL — HIGH (ref 0.7–1.5)
GLUCOSE SERPL-MCNC: 160 MG/DL — HIGH (ref 70–110)
HCT VFR BLD CALC: 30.8 % — LOW (ref 42–52)
HGB BLD-MCNC: 9.9 G/DL — LOW (ref 14–18)
MAGNESIUM SERPL-MCNC: 1.7 MG/DL — LOW (ref 1.8–2.4)
MCHC RBC-ENTMCNC: 27.5 PG — SIGNIFICANT CHANGE UP (ref 27–31)
MCHC RBC-ENTMCNC: 32.1 G/DL — SIGNIFICANT CHANGE UP (ref 32–37)
MCV RBC AUTO: 85.6 FL — SIGNIFICANT CHANGE UP (ref 80–94)
NRBC # BLD: 0 /100 WBCS — SIGNIFICANT CHANGE UP (ref 0–0)
PHOSPHATE SERPL-MCNC: 3.5 MG/DL — SIGNIFICANT CHANGE UP (ref 2.1–4.9)
PLATELET # BLD AUTO: 192 K/UL — SIGNIFICANT CHANGE UP (ref 130–400)
POTASSIUM SERPL-MCNC: 4.7 MMOL/L — SIGNIFICANT CHANGE UP (ref 3.5–5)
POTASSIUM SERPL-SCNC: 4.7 MMOL/L — SIGNIFICANT CHANGE UP (ref 3.5–5)
PROT SERPL-MCNC: 5.9 G/DL — LOW (ref 6–8)
PTH-INTACT FLD-MCNC: 106 PG/ML — HIGH (ref 15–65)
RBC # BLD: 3.6 M/UL — LOW (ref 4.7–6.1)
RBC # FLD: 15.5 % — HIGH (ref 11.5–14.5)
SODIUM SERPL-SCNC: 140 MMOL/L — SIGNIFICANT CHANGE UP (ref 135–146)
WBC # BLD: 8.88 K/UL — SIGNIFICANT CHANGE UP (ref 4.8–10.8)
WBC # FLD AUTO: 8.88 K/UL — SIGNIFICANT CHANGE UP (ref 4.8–10.8)

## 2018-02-24 RX ORDER — RISPERIDONE 4 MG/1
0.25 TABLET ORAL DAILY
Qty: 0 | Refills: 0 | Status: DISCONTINUED | OUTPATIENT
Start: 2018-02-24 | End: 2018-03-01

## 2018-02-24 RX ORDER — SODIUM CHLORIDE 9 MG/ML
1000 INJECTION INTRAMUSCULAR; INTRAVENOUS; SUBCUTANEOUS
Qty: 0 | Refills: 0 | Status: DISCONTINUED | OUTPATIENT
Start: 2018-02-24 | End: 2018-02-28

## 2018-02-24 RX ORDER — LABETALOL HCL 100 MG
200 TABLET ORAL THREE TIMES A DAY
Qty: 0 | Refills: 0 | Status: DISCONTINUED | OUTPATIENT
Start: 2018-02-24 | End: 2018-03-01

## 2018-02-24 RX ORDER — MIDAZOLAM HYDROCHLORIDE 1 MG/ML
1 INJECTION, SOLUTION INTRAMUSCULAR; INTRAVENOUS ONCE
Qty: 0 | Refills: 0 | Status: DISCONTINUED | OUTPATIENT
Start: 2018-02-24 | End: 2018-02-24

## 2018-02-24 RX ORDER — MAGNESIUM SULFATE 500 MG/ML
2 VIAL (ML) INJECTION ONCE
Qty: 0 | Refills: 0 | Status: COMPLETED | OUTPATIENT
Start: 2018-02-24 | End: 2018-02-25

## 2018-02-24 RX ADMIN — Medication 2: at 11:47

## 2018-02-24 RX ADMIN — Medication 1 APPLICATION(S): at 10:25

## 2018-02-24 RX ADMIN — Medication 81 MILLIGRAM(S): at 11:41

## 2018-02-24 RX ADMIN — Medication 200 MILLIGRAM(S): at 22:36

## 2018-02-24 RX ADMIN — Medication 325 MILLIGRAM(S): at 08:58

## 2018-02-24 RX ADMIN — Medication 650 MILLIGRAM(S): at 22:36

## 2018-02-24 RX ADMIN — SEVELAMER CARBONATE 800 MILLIGRAM(S): 2400 POWDER, FOR SUSPENSION ORAL at 08:59

## 2018-02-24 RX ADMIN — Medication 1 APPLICATION(S): at 17:21

## 2018-02-24 RX ADMIN — MORPHINE SULFATE 2 MILLIGRAM(S): 50 CAPSULE, EXTENDED RELEASE ORAL at 17:07

## 2018-02-24 RX ADMIN — SEVELAMER CARBONATE 800 MILLIGRAM(S): 2400 POWDER, FOR SUSPENSION ORAL at 11:40

## 2018-02-24 RX ADMIN — Medication 1 DROP(S): at 17:05

## 2018-02-24 RX ADMIN — SODIUM CHLORIDE 75 MILLILITER(S): 9 INJECTION INTRAMUSCULAR; INTRAVENOUS; SUBCUTANEOUS at 18:07

## 2018-02-24 RX ADMIN — MIDAZOLAM HYDROCHLORIDE 1 MILLIGRAM(S): 1 INJECTION, SOLUTION INTRAMUSCULAR; INTRAVENOUS at 14:54

## 2018-02-24 RX ADMIN — Medication 1 DROP(S): at 23:58

## 2018-02-24 RX ADMIN — AMLODIPINE BESYLATE 10 MILLIGRAM(S): 2.5 TABLET ORAL at 22:34

## 2018-02-24 RX ADMIN — Medication 41.67 GRAM(S): at 01:24

## 2018-02-24 RX ADMIN — DIPHENHYDRAMINE HYDROCHLORIDE AND LIDOCAINE HYDROCHLORIDE AND ALUMINUM HYDROXIDE AND MAGNESIUM HYDRO 30 MILLILITER(S): KIT at 05:21

## 2018-02-24 RX ADMIN — HEPARIN SODIUM 5000 UNIT(S): 5000 INJECTION INTRAVENOUS; SUBCUTANEOUS at 17:04

## 2018-02-24 RX ADMIN — TOBRAMYCIN AND DEXAMETHASONE 1 APPLICATION(S): 1; 3 SUSPENSION/ DROPS OPHTHALMIC at 05:24

## 2018-02-24 RX ADMIN — HEPARIN SODIUM 5000 UNIT(S): 5000 INJECTION INTRAVENOUS; SUBCUTANEOUS at 22:35

## 2018-02-24 RX ADMIN — SEVELAMER CARBONATE 800 MILLIGRAM(S): 2400 POWDER, FOR SUSPENSION ORAL at 17:05

## 2018-02-24 RX ADMIN — Medication 1 DROP(S): at 05:20

## 2018-02-24 RX ADMIN — Medication 1 SPRAY(S): at 05:24

## 2018-02-24 RX ADMIN — CLOPIDOGREL BISULFATE 75 MILLIGRAM(S): 75 TABLET, FILM COATED ORAL at 11:42

## 2018-02-24 RX ADMIN — Medication 500 MILLIGRAM(S): at 11:57

## 2018-02-24 RX ADMIN — DIPHENHYDRAMINE HYDROCHLORIDE AND LIDOCAINE HYDROCHLORIDE AND ALUMINUM HYDROXIDE AND MAGNESIUM HYDRO 30 MILLILITER(S): KIT at 17:19

## 2018-02-24 RX ADMIN — Medication 1 APPLICATION(S): at 05:23

## 2018-02-24 RX ADMIN — Medication 650 MILLIGRAM(S): at 05:22

## 2018-02-24 RX ADMIN — Medication 1 DROP(S): at 01:23

## 2018-02-24 RX ADMIN — PANTOPRAZOLE SODIUM 40 MILLIGRAM(S): 20 TABLET, DELAYED RELEASE ORAL at 11:42

## 2018-02-24 RX ADMIN — DIPHENHYDRAMINE HYDROCHLORIDE AND LIDOCAINE HYDROCHLORIDE AND ALUMINUM HYDROXIDE AND MAGNESIUM HYDRO 30 MILLILITER(S): KIT at 17:04

## 2018-02-24 RX ADMIN — Medication 325 MILLIGRAM(S): at 11:43

## 2018-02-24 RX ADMIN — TOBRAMYCIN AND DEXAMETHASONE 1 APPLICATION(S): 1; 3 SUSPENSION/ DROPS OPHTHALMIC at 23:58

## 2018-02-24 RX ADMIN — Medication 200 MILLIGRAM(S): at 17:06

## 2018-02-24 RX ADMIN — HEPARIN SODIUM 5000 UNIT(S): 5000 INJECTION INTRAVENOUS; SUBCUTANEOUS at 05:22

## 2018-02-24 RX ADMIN — Medication 1 APPLICATION(S): at 01:24

## 2018-02-24 RX ADMIN — Medication 4: at 08:55

## 2018-02-24 RX ADMIN — TOBRAMYCIN AND DEXAMETHASONE 1 APPLICATION(S): 1; 3 SUSPENSION/ DROPS OPHTHALMIC at 17:22

## 2018-02-24 RX ADMIN — Medication 1 SPRAY(S): at 22:34

## 2018-02-24 RX ADMIN — Medication 500 MILLIGRAM(S): at 01:28

## 2018-02-24 RX ADMIN — TOBRAMYCIN AND DEXAMETHASONE 1 APPLICATION(S): 1; 3 SUSPENSION/ DROPS OPHTHALMIC at 01:23

## 2018-02-24 RX ADMIN — Medication 1 DROP(S): at 11:40

## 2018-02-24 RX ADMIN — Medication 200 MILLIGRAM(S): at 05:21

## 2018-02-24 RX ADMIN — TOBRAMYCIN AND DEXAMETHASONE 1 APPLICATION(S): 1; 3 SUSPENSION/ DROPS OPHTHALMIC at 12:12

## 2018-02-24 RX ADMIN — Medication 1 SPRAY(S): at 17:04

## 2018-02-24 RX ADMIN — Medication 650 MILLIGRAM(S): at 17:19

## 2018-02-24 RX ADMIN — MORPHINE SULFATE 2 MILLIGRAM(S): 50 CAPSULE, EXTENDED RELEASE ORAL at 17:23

## 2018-02-24 RX ADMIN — ZINC SULFATE TAB 220 MG (50 MG ZINC EQUIVALENT) 220 MILLIGRAM(S): 220 (50 ZN) TAB at 11:57

## 2018-02-24 RX ADMIN — Medication 325 MILLIGRAM(S): at 17:06

## 2018-02-24 RX ADMIN — DIPHENHYDRAMINE HYDROCHLORIDE AND LIDOCAINE HYDROCHLORIDE AND ALUMINUM HYDROXIDE AND MAGNESIUM HYDRO 30 MILLILITER(S): KIT at 14:46

## 2018-02-24 NOTE — BEHAVIORAL HEALTH ASSESSMENT NOTE - HPI (INCLUDE ILLNESS QUALITY, SEVERITY, DURATION, TIMING, CONTEXT, MODIFYING FACTORS, ASSOCIATED SIGNS AND SYMPTOMS)
Pt is a 63yo retired  domiciled Chinese male with hx of HTN, Hyperlipidemia, Diabetes, recent kidney failure, and stroke, who was transferred from [facility] for Isaiah-Kurt syndrome.     Per pt's son Jorge, pt was admitted on last Monday to [facility] burn unit and has been suffering severe pain and/due to complex medical condition.  Per Jorge, due to insufficient pain control, and limited PO intake, during the procedure of inserting NG tube on last Wednesday, pt became agitated and made the statement of "cannot deal with this anymore".      Per Jorge, pt has never had SI before, or any self-harming behaviors.  He felt his father was expressing how much pain he went through. When undersigned interviewed the pt at bedside today, which has been four days after the agitation episode, pt appears calm and cooperative. He has limited verbal communication due to his skin lesions around and within his mouth and throat. Pt is in apparent medical distress. He communicates with the undersigned with facial expression and limited words in Chinese.    Per his son Jorge, pt's medication condition has been progressing toward stable.  Pt's been cooperative with treatment, except there were times that pt seems having changed mental status, displaying disoriented behaviors, such as walking out of his room with IV lines attached.     No past history of mental health issue, no psychiatry care in the past.  No substance abuse or trauma.  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  He has never been compliant with medical treatment or diet recommendation to manage his diabetes, result in recent kidney failure, stroke, worsening of his cardiovascular conditions.

## 2018-02-24 NOTE — PROGRESS NOTE ADULT - SUBJECTIVE AND OBJECTIVE BOX
seen and examined   no distress  diffuse rash       Standing Inpatient Medications  amLODIPine   Tablet 10 milliGRAM(s) Oral at bedtime  artificial  tears Solution 1 Drop(s) Both EYES every 6 hours  ascorbic acid      ascorbic acid 500 milliGRAM(s) Oral daily  aspirin enteric coated 81 milliGRAM(s) Oral daily  benzocaine 20% Spray 1 Spray(s) Topical three times a day  clopidogrel Tablet 75 milliGRAM(s) Oral daily  dextrose 5%. 1000 milliLiter(s) IV Continuous <Continuous>  dextrose 50% Injectable 12.5 Gram(s) IV Push once  dextrose 50% Injectable 25 Gram(s) IV Push once  dextrose 50% Injectable 25 Gram(s) IV Push once  docusate sodium 100 milliGRAM(s) Oral three times a day  ferrous sulfate Oral Tab/Cap - Peds 325 milliGRAM(s) Oral three times a day with meals  FIRST- Mouthwash  BLM 30 milliLiter(s) Swish and Spit every 4 hours  heparin  Injectable 5000 Unit(s) SubCutaneous every 8 hours  immune globulin gamma IVPB 25 Gram(s) IV Intermittent every 24 hours  insulin lispro (HumaLOG) corrective regimen sliding scale   SubCutaneous three times a day before meals  insulin lispro Injectable (HumaLOG). 5 Unit(s) SubCutaneous once  labetalol 200 milliGRAM(s) Oral two times a day  pantoprazole    Tablet 40 milliGRAM(s) Oral before breakfast  pantoprazole   Suspension 40 milliGRAM(s) Oral daily  sevelamer carbonate Oral Powder - Peds 800 milliGRAM(s) Oral three times a day with meals  sodium bicarbonate 650 milliGRAM(s) Oral every 8 hours  sodium chloride 0.9%. 1000 milliLiter(s) IV Continuous <Continuous>  tobramycin/dexamethasone Ointment 1 Application(s) Both EYES every 6 hours  vitamin A &amp; D Ointment 1 Application(s) Topical every 4 hours  zinc sulfate 220 milliGRAM(s) Oral daily    VITALS/PHYSICAL EXAM  --------------------------------------------------------------------------------  T(C): 37.3 (02-24-18 @ 07:57), Max: 37.3 (02-24-18 @ 07:57)  HR: 97 (02-24-18 @ 07:57) (86 - 97)  BP: 161/72 (02-24-18 @ 07:57) (154/72 - 172/81)  RR: 18 (02-24-18 @ 07:57) (18 - 18)  SpO2: 98% (02-24-18 @ 00:19) (98% - 99%)  Wt(kg): --        02-23-18 @ 07:01  -  02-24-18 @ 07:00  --------------------------------------------------------  IN: 3280 mL / OUT: 1450 mL / NET: 1830 mL      Physical Exam:  	Gen: NAD  	HEENT: NG tube  	Pulm: decrease BS B/L  	CV:  S1S2; no rub  	Abd: soft, nontender/nondistended  	LE: no edema   	Skin: diffuse rash   	    LABS/STUDIES  --------------------------------------------------------------------------------              10.1   9.27  >-----------<  223      [02-23-18 @ 16:53]              31.2     139  |  112  |  54  ----------------------------<  221      [02-23-18 @ 16:53]  4.9   |  21  |  2.7        Ca     8.0     [02-23-18 @ 16:53]      Mg     1.8     [02-23-18 @ 16:53]      Phos  3.3     [02-23-18 @ 16:53]      PT/INR: PT 12.60, INR 1.16       [02-22-18 @ 16:26]  PTT: 51.0       [02-22-18 @ 16:26]      Creatinine Trend:  SCr 2.7 [02-23 @ 16:53]  SCr 3.0 [02-22 @ 16:26]  SCr 3.4 [02-21 @ 17:39]  SCr 4.0 [02-20 @ 08:24]      PTH -- (Ca 8.1)      [02-23-18 @ 16:53]   106  PTH -- (Ca 8.1)      [02-22-18 @ 09:35]   89  PTH -- (Ca 8.5)      [02-21-18 @ 11:43]   69  HbA1c 6.5      [02-20-18 @ 08:43]

## 2018-02-24 NOTE — PROGRESS NOTE ADULT - ASSESSMENT
61 yo man with PMH CKD 4 , HTN and Dm presenting for a transfer from [facility] for SJS  1- creatinine trending down  2- non oliguric  3-d/c renagel  4- d/c iv fluids   5- BP not well controlled, increase labetalol to 200 q 8  6- continue sodium bicarb po  7-will follow

## 2018-02-24 NOTE — BEHAVIORAL HEALTH ASSESSMENT NOTE - SUMMARY
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 Pt became agitated due to pain, or in delirium.  No SI or HI.  He has been in emotional distress as well. Pt needs psychiatric outpatient follow up due to mental health risks secondary to recent deterioration of his medical condition.

## 2018-02-24 NOTE — BEHAVIORAL HEALTH ASSESSMENT NOTE - NSBHCHARTREVIEWVS_PSY_A_CORE FT
CAPILLARY BLOOD GLUCOSE        CBC Full  -  ( 2018 16:53 )  WBC Count : 9.27 K/uL  Hemoglobin : 10.1 g/dL  Hematocrit : 31.2 %  Platelet Count - Automated : 223 K/uL  Mean Cell Volume : 86.2 fL  Mean Cell Hemoglobin : 27.9 pg  Mean Cell Hemoglobin Concentration : 32.4 g/dL  Auto Neutrophil # : x  Auto Lymphocyte # : x  Auto Monocyte # : x  Auto Eosinophil # : x  Auto Basophil # : x  Auto Neutrophil % : x  Auto Lymphocyte % : x  Auto Monocyte % : x  Auto Eosinophil % : x  Auto Basophil % : x        139  |  112<H>  |  54<H>  ----------------------------<  221<H>  4.9   |  21  |  2.7<H>    Ca    8.0<L>      2018 16:53  Phos  3.3       Mg     1.8           Urinalysis Basic - ( 2018 00:56 )    Color: Yellow / Appearance: Clear / S.015 / pH: x  Gluc: x / Ketone: Negative  / Bili: Negative / Urobili: 0.2 mg/dL   Blood: x / Protein: >=300 mg/dL / Nitrite: Negative   Leuk Esterase: Negative / RBC: 1-2 /HPF / WBC x   Sq Epi: x / Non Sq Epi: x / Bacteria: x

## 2018-02-24 NOTE — PROGRESS NOTE ADULT - ASSESSMENT
ASSESSMENT/ PLAN :   Guarded  TENS. Mild symptomatic and clinical improvement. Continue IVIg  Continue wound care, mouth care and eye ointment / drops  Cont pain mgmt, IV hydration supportive care     Cont TF, po as tolerated VTE prophylaxis  Psych: in better mental state today. Psychiatry eval appreciated. Rec noted  CKD: Creatinine decreasing. Good uo. Nephrology  rec appreciated  Htn: elevated BP. Will increase labetalol;   d/c renagel.  NGT replaced. Will d/c IVF once TF restarted.

## 2018-02-24 NOTE — PROGRESS NOTE ADULT - SUBJECTIVE AND OBJECTIVE BOX
O/N no acute events. Pt elen TF OOB in chair. States that he feels better  No acute events o/n. Son states that pt ambulates with walker despite involvement of feet  Vital Signs Last 24 Hrs  T(C): 35.7 (24 Feb 2018 16:17), Max: 37.3 (24 Feb 2018 07:57)  T(F): 96.3 (24 Feb 2018 16:17), Max: 99.2 (24 Feb 2018 07:57)  HR: 74 (24 Feb 2018 16:17) (74 - 97)  BP: 172/81 (24 Feb 2018 16:17) (154/72 - 172/81)  RR: 18 (24 Feb 2018 16:17) (18 - 18)  SpO2: 98% (24 Feb 2018 00:19) (98% - 98%)        I&O's Summary    23 Feb 2018 07:01  -  24 Feb 2018 07:00  --------------------------------------------------------  IN: 3280 mL / OUT: 1450 mL / NET: 1830 mL    24 Feb 2018 07:01  -  24 Feb 2018 18:14  --------------------------------------------------------  IN: 1870 mL / OUT: 900 mL / NET: 970 mL    IVF @ 75cc/hr      02-23    139  |  112<H>  |  54<H>  ----------------------------<  221<H>  4.9   |  21  |  2.7<H>    Ca    8.0<L>      23 Feb 2018 16:53  Phos  3.3     02-23  Mg     1.8     02-23                            10.1   9.27  )-----------( 223      ( 23 Feb 2018 16:53 )             31.2     CAPILLARY BLOOD GLUCOSE  135 (24 Feb 2018 16:17)  169 (24 Feb 2018 11:07)  225 (24 Feb 2018 07:57)  178 (24 Feb 2018 05:21)  157 (23 Feb 2018 20:58)      EXAM:  Scalp and face - decreased erythema of rash and mild exfoliation c/w drying  Oral - crusted bloody lips  Torso- no significant  open areas.  Palms - decreased tenderness and erythema  Soles- tender, no open wounds        ASSESSMENT/ PLAN :   Guarded  TENS. Mild symptomatic and clinical improvement. Continue IVIg  Continue wound care, mouth care and eye ointment / drops  Cont pain mgmt, IV hydration supportive care     Cont TF, po as tolerated VTE prophylaxis O/N no acute events. Pt elen TF OOB in chair. States that he feels better  No acute events o/n. Son states that pt ambulates with walker despite involvement of feet  Vital Signs Last 24 Hrs  T(C): 35.7 (24 Feb 2018 16:17), Max: 37.3 (24 Feb 2018 07:57)  T(F): 96.3 (24 Feb 2018 16:17), Max: 99.2 (24 Feb 2018 07:57)  HR: 74 (24 Feb 2018 16:17) (74 - 97)  BP: 172/81 (24 Feb 2018 16:17) (154/72 - 172/81)  RR: 18 (24 Feb 2018 16:17) (18 - 18)  SpO2: 98% (24 Feb 2018 00:19) (98% - 98%)        I&O's Summary    23 Feb 2018 07:01  -  24 Feb 2018 07:00  --------------------------------------------------------  IN: 3280 mL / OUT: 1450 mL / NET: 1830 mL    24 Feb 2018 07:01  -  24 Feb 2018 18:14  --------------------------------------------------------  IN: 1870 mL / OUT: 900 mL / NET: 970 mL    IVF @ 75cc/hr      02-23    139  |  112<H>  |  54<H>  ----------------------------<  221<H>  4.9   |  21  |  2.7<H>    Ca    8.0<L>      23 Feb 2018 16:53  Phos  3.3     02-23  Mg     1.8     02-23                            10.1   9.27  )-----------( 223      ( 23 Feb 2018 16:53 )             31.2     CAPILLARY BLOOD GLUCOSE  135 (24 Feb 2018 16:17)  169 (24 Feb 2018 11:07)  225 (24 Feb 2018 07:57)  178 (24 Feb 2018 05:21)  157 (23 Feb 2018 20:58)      EXAM:  Scalp and face - decreased erythema of rash and mild exfoliation c/w drying  Oral - crusted bloody lips  Torso- no significant  open areas.  Palms - decreased tenderness and erythema  Soles- tender, no open wounds        ASSESSMENT/ PLAN :   Guarded  TENS. Mild symptomatic and clinical improvement. Continue IVIg  Continue wound care, mouth care and eye ointment / drops  Cont pain mgmt, IV hydration supportive care     Cont TF, po as tolerated VTE prophylaxis  Psych: in better mental state today. Psychiatry eval appreciated. Rec noted  CKD O/N no acute events. Pt elen TF OOB in chair. States that he feels better  No acute events o/n. Son states that pt ambulates with walker despite involvement of feet  Vital Signs Last 24 Hrs  T(C): 35.7 (24 Feb 2018 16:17), Max: 37.3 (24 Feb 2018 07:57)  T(F): 96.3 (24 Feb 2018 16:17), Max: 99.2 (24 Feb 2018 07:57)  HR: 74 (24 Feb 2018 16:17) (74 - 97)  BP: 172/81 (24 Feb 2018 16:17) (154/72 - 172/81)  RR: 18 (24 Feb 2018 16:17) (18 - 18)  SpO2: 98% (24 Feb 2018 00:19) (98% - 98%)        I&O's Summary    23 Feb 2018 07:01  -  24 Feb 2018 07:00  --------------------------------------------------------  IN: 3280 mL / OUT: 1450 mL / NET: 1830 mL    24 Feb 2018 07:01  -  24 Feb 2018 18:14  --------------------------------------------------------  IN: 1870 mL / OUT: 900 mL / NET: 970 mL    IVF @ 75cc/hr      02-23    139  |  112<H>  |  54<H>  ----------------------------<  221<H>  4.9   |  21  |  2.7<H>    Ca    8.0<L>      23 Feb 2018 16:53  Phos  3.3     02-23  Mg     1.8     02-23                            10.1   9.27  )-----------( 223      ( 23 Feb 2018 16:53 )             31.2     CAPILLARY BLOOD GLUCOSE  135 (24 Feb 2018 16:17)  169 (24 Feb 2018 11:07)  225 (24 Feb 2018 07:57)  178 (24 Feb 2018 05:21)  157 (23 Feb 2018 20:58)      EXAM:  Scalp and face - decreased erythema of rash and mild exfoliation c/w drying  Oral - crusted bloody lips  Torso- no significant  open areas.  Palms - decreased tenderness and erythema  Soles- tender, no open wounds

## 2018-02-25 LAB
ALBUMIN SERPL ELPH-MCNC: 2.1 G/DL — LOW (ref 3–5.5)
ALP SERPL-CCNC: 76 U/L — SIGNIFICANT CHANGE UP (ref 30–115)
ALT FLD-CCNC: 21 U/L — SIGNIFICANT CHANGE UP (ref 0–41)
ANION GAP SERPL CALC-SCNC: 4 MMOL/L — LOW (ref 7–14)
AST SERPL-CCNC: 26 U/L — SIGNIFICANT CHANGE UP (ref 0–41)
BILIRUB SERPL-MCNC: 0.4 MG/DL — SIGNIFICANT CHANGE UP (ref 0.2–1.2)
BUN SERPL-MCNC: 53 MG/DL — HIGH (ref 10–20)
CALCIUM SERPL-MCNC: 8.6 MG/DL — SIGNIFICANT CHANGE UP (ref 8.5–10.1)
CHLORIDE SERPL-SCNC: 112 MMOL/L — HIGH (ref 98–110)
CO2 SERPL-SCNC: 24 MMOL/L — SIGNIFICANT CHANGE UP (ref 17–32)
CREAT SERPL-MCNC: 2.7 MG/DL — HIGH (ref 0.7–1.5)
GLUCOSE SERPL-MCNC: 157 MG/DL — HIGH (ref 70–110)
HCT VFR BLD CALC: 30.7 % — LOW (ref 42–52)
HGB BLD-MCNC: 9.8 G/DL — LOW (ref 14–18)
MAGNESIUM SERPL-MCNC: 2.1 MG/DL — SIGNIFICANT CHANGE UP (ref 1.8–2.4)
MCHC RBC-ENTMCNC: 27.5 PG — SIGNIFICANT CHANGE UP (ref 27–31)
MCHC RBC-ENTMCNC: 31.9 G/DL — LOW (ref 32–37)
MCV RBC AUTO: 86.2 FL — SIGNIFICANT CHANGE UP (ref 80–94)
NRBC # BLD: 0 /100 WBCS — SIGNIFICANT CHANGE UP (ref 0–0)
PHOSPHATE SERPL-MCNC: 3.9 MG/DL — SIGNIFICANT CHANGE UP (ref 2.1–4.9)
PLATELET # BLD AUTO: 193 K/UL — SIGNIFICANT CHANGE UP (ref 130–400)
POTASSIUM SERPL-MCNC: 4.5 MMOL/L — SIGNIFICANT CHANGE UP (ref 3.5–5)
POTASSIUM SERPL-SCNC: 4.5 MMOL/L — SIGNIFICANT CHANGE UP (ref 3.5–5)
PROT SERPL-MCNC: 6.1 G/DL — SIGNIFICANT CHANGE UP (ref 6–8)
RBC # BLD: 3.56 M/UL — LOW (ref 4.7–6.1)
RBC # FLD: 15.4 % — HIGH (ref 11.5–14.5)
SODIUM SERPL-SCNC: 140 MMOL/L — SIGNIFICANT CHANGE UP (ref 135–146)
WBC # BLD: 8.42 K/UL — SIGNIFICANT CHANGE UP (ref 4.8–10.8)
WBC # FLD AUTO: 8.42 K/UL — SIGNIFICANT CHANGE UP (ref 4.8–10.8)

## 2018-02-25 RX ORDER — MORPHINE SULFATE 50 MG/1
2 CAPSULE, EXTENDED RELEASE ORAL ONCE
Qty: 0 | Refills: 0 | Status: DISCONTINUED | OUTPATIENT
Start: 2018-02-25 | End: 2018-02-25

## 2018-02-25 RX ORDER — DIPHENHYDRAMINE HCL 50 MG
25 CAPSULE ORAL AT BEDTIME
Qty: 0 | Refills: 0 | Status: DISCONTINUED | OUTPATIENT
Start: 2018-02-25 | End: 2018-03-01

## 2018-02-25 RX ADMIN — DIPHENHYDRAMINE HYDROCHLORIDE AND LIDOCAINE HYDROCHLORIDE AND ALUMINUM HYDROXIDE AND MAGNESIUM HYDRO 30 MILLILITER(S): KIT at 14:15

## 2018-02-25 RX ADMIN — Medication 41.67 GRAM(S): at 22:47

## 2018-02-25 RX ADMIN — MORPHINE SULFATE 2 MILLIGRAM(S): 50 CAPSULE, EXTENDED RELEASE ORAL at 08:00

## 2018-02-25 RX ADMIN — Medication 1 DROP(S): at 23:45

## 2018-02-25 RX ADMIN — Medication 1 SPRAY(S): at 05:18

## 2018-02-25 RX ADMIN — MORPHINE SULFATE 2 MILLIGRAM(S): 50 CAPSULE, EXTENDED RELEASE ORAL at 11:56

## 2018-02-25 RX ADMIN — Medication 1 APPLICATION(S): at 22:05

## 2018-02-25 RX ADMIN — Medication 1 DROP(S): at 17:02

## 2018-02-25 RX ADMIN — TOBRAMYCIN AND DEXAMETHASONE 1 APPLICATION(S): 1; 3 SUSPENSION/ DROPS OPHTHALMIC at 05:19

## 2018-02-25 RX ADMIN — Medication 325 MILLIGRAM(S): at 08:31

## 2018-02-25 RX ADMIN — TOBRAMYCIN AND DEXAMETHASONE 1 APPLICATION(S): 1; 3 SUSPENSION/ DROPS OPHTHALMIC at 17:04

## 2018-02-25 RX ADMIN — Medication 1 APPLICATION(S): at 10:03

## 2018-02-25 RX ADMIN — PANTOPRAZOLE SODIUM 40 MILLIGRAM(S): 20 TABLET, DELAYED RELEASE ORAL at 11:49

## 2018-02-25 RX ADMIN — HEPARIN SODIUM 5000 UNIT(S): 5000 INJECTION INTRAVENOUS; SUBCUTANEOUS at 05:17

## 2018-02-25 RX ADMIN — TOBRAMYCIN AND DEXAMETHASONE 1 APPLICATION(S): 1; 3 SUSPENSION/ DROPS OPHTHALMIC at 11:49

## 2018-02-25 RX ADMIN — MORPHINE SULFATE 2 MILLIGRAM(S): 50 CAPSULE, EXTENDED RELEASE ORAL at 06:39

## 2018-02-25 RX ADMIN — HEPARIN SODIUM 5000 UNIT(S): 5000 INJECTION INTRAVENOUS; SUBCUTANEOUS at 22:04

## 2018-02-25 RX ADMIN — Medication 500 MILLIGRAM(S): at 11:47

## 2018-02-25 RX ADMIN — Medication 1 APPLICATION(S): at 17:03

## 2018-02-25 RX ADMIN — Medication 41.67 GRAM(S): at 01:35

## 2018-02-25 RX ADMIN — ZINC SULFATE TAB 220 MG (50 MG ZINC EQUIVALENT) 220 MILLIGRAM(S): 220 (50 ZN) TAB at 11:47

## 2018-02-25 RX ADMIN — CLOPIDOGREL BISULFATE 75 MILLIGRAM(S): 75 TABLET, FILM COATED ORAL at 11:46

## 2018-02-25 RX ADMIN — Medication 1 APPLICATION(S): at 14:16

## 2018-02-25 RX ADMIN — Medication 1 DROP(S): at 05:18

## 2018-02-25 RX ADMIN — Medication 2: at 17:03

## 2018-02-25 RX ADMIN — AMLODIPINE BESYLATE 10 MILLIGRAM(S): 2.5 TABLET ORAL at 22:04

## 2018-02-25 RX ADMIN — MORPHINE SULFATE 2 MILLIGRAM(S): 50 CAPSULE, EXTENDED RELEASE ORAL at 01:00

## 2018-02-25 RX ADMIN — Medication 50 GRAM(S): at 05:38

## 2018-02-25 RX ADMIN — Medication 325 MILLIGRAM(S): at 17:04

## 2018-02-25 RX ADMIN — MORPHINE SULFATE 2 MILLIGRAM(S): 50 CAPSULE, EXTENDED RELEASE ORAL at 00:04

## 2018-02-25 RX ADMIN — Medication 200 MILLIGRAM(S): at 22:04

## 2018-02-25 RX ADMIN — Medication 200 MILLIGRAM(S): at 05:17

## 2018-02-25 RX ADMIN — PANTOPRAZOLE SODIUM 40 MILLIGRAM(S): 20 TABLET, DELAYED RELEASE ORAL at 06:25

## 2018-02-25 RX ADMIN — Medication 325 MILLIGRAM(S): at 11:47

## 2018-02-25 RX ADMIN — HEPARIN SODIUM 5000 UNIT(S): 5000 INJECTION INTRAVENOUS; SUBCUTANEOUS at 14:16

## 2018-02-25 RX ADMIN — TOBRAMYCIN AND DEXAMETHASONE 1 APPLICATION(S): 1; 3 SUSPENSION/ DROPS OPHTHALMIC at 23:45

## 2018-02-25 RX ADMIN — Medication 200 MILLIGRAM(S): at 14:16

## 2018-02-25 RX ADMIN — Medication 650 MILLIGRAM(S): at 22:04

## 2018-02-25 RX ADMIN — Medication 650 MILLIGRAM(S): at 14:16

## 2018-02-25 RX ADMIN — DIPHENHYDRAMINE HYDROCHLORIDE AND LIDOCAINE HYDROCHLORIDE AND ALUMINUM HYDROXIDE AND MAGNESIUM HYDRO 30 MILLILITER(S): KIT at 05:18

## 2018-02-25 RX ADMIN — Medication 650 MILLIGRAM(S): at 05:17

## 2018-02-25 RX ADMIN — Medication 2: at 08:36

## 2018-02-25 RX ADMIN — Medication 1 DROP(S): at 11:46

## 2018-02-25 RX ADMIN — MORPHINE SULFATE 2 MILLIGRAM(S): 50 CAPSULE, EXTENDED RELEASE ORAL at 12:15

## 2018-02-25 NOTE — PROGRESS NOTE ADULT - ASSESSMENT
1 PAVITHRA/CKD STAGE 3/4  CREATININE IS TRENDING DOWN  NON OLIGURIC  METABOLIC ACIDOSIS ON ORAL BICARB LAST BICARB 21    2 TENS > 60 % INVOLVEMNET  ON IV ANTIBIOTICS    3 PO4/CA/PTH AT GOAL  4 BLOOD PRESSURE CONTROLLED IF BP > 150/90  CAN INCREASE LABETALOL  MG TID  WILL FOLLOW

## 2018-02-25 NOTE — PROGRESS NOTE ADULT - SUBJECTIVE AND OBJECTIVE BOX
patient is seen and examined  no complaints  no major events over last 24 hours    ON EXAMINATION  Vital Signs Last 24 Hrs  T(C): 35.7 (25 Feb 2018 08:32), Max: 36.7 (24 Feb 2018 23:35)  T(F): 96.3 (25 Feb 2018 08:32), Max: 98.1 (24 Feb 2018 23:35)  HR: 78 (25 Feb 2018 08:32) (74 - 80)  BP: 151/74 (25 Feb 2018 08:32) (151/74 - 172/81)  BP(mean): --  RR: 18 (25 Feb 2018 08:32) (18 - 18)  SpO2: --    ALERT ORIENTED TENS > 60 % Involvement  CHEST CLEAR BILATERALLY  CVS S1 AND S2 NO ADDED SOUND  ABDOMEN SOFT LAX NO ORGANOMEGALY  NO PEDAL EDEMA    02-24    140  |  114<H>  |  51<H>  ----------------------------<  160<H>  4.7   |  21  |  2.6<H>    Ca    8.1<L>      24 Feb 2018 18:57  Phos  3.5     02-24  Mg     1.7     02-24    TPro  5.9<L>  /  Alb  2.1<L>  /  TBili  0.5  /  DBili  x   /  AST  27  /  ALT  19  /  AlkPhos  74  02-24                        9.9    8.88  )-----------( 192      ( 24 Feb 2018 18:57 )             30.8     MEDICATIONS  (STANDING):  amLODIPine   Tablet 10 milliGRAM(s) Oral at bedtime  artificial  tears Solution 1 Drop(s) Both EYES every 6 hours  ascorbic acid      ascorbic acid 500 milliGRAM(s) Oral daily  aspirin enteric coated 81 milliGRAM(s) Oral daily  benzocaine 20% Spray 1 Spray(s) Topical three times a day  clopidogrel Tablet 75 milliGRAM(s) Oral daily  dextrose 5%. 1000 milliLiter(s) (50 mL/Hr) IV Continuous <Continuous>  dextrose 50% Injectable 12.5 Gram(s) IV Push once  dextrose 50% Injectable 25 Gram(s) IV Push once  dextrose 50% Injectable 25 Gram(s) IV Push once  docusate sodium 100 milliGRAM(s) Oral three times a day  ferrous sulfate Oral Tab/Cap - Peds 325 milliGRAM(s) Oral three times a day with meals  FIRST- Mouthwash  BLM 30 milliLiter(s) Swish and Spit every 4 hours  heparin  Injectable 5000 Unit(s) SubCutaneous every 8 hours  immune globulin gamma IVPB 25 Gram(s) IV Intermittent every 24 hours  insulin lispro (HumaLOG) corrective regimen sliding scale   SubCutaneous three times a day before meals  insulin lispro Injectable (HumaLOG). 5 Unit(s) SubCutaneous once  labetalol 200 milliGRAM(s) Oral three times a day  pantoprazole    Tablet 40 milliGRAM(s) Oral before breakfast  pantoprazole   Suspension 40 milliGRAM(s) Oral daily  sodium bicarbonate 650 milliGRAM(s) Oral every 8 hours  sodium chloride 0.9%. 1000 milliLiter(s) (50 mL/Hr) IV Continuous <Continuous>  tobramycin/dexamethasone Ointment 1 Application(s) Both EYES every 6 hours  vitamin A &amp; D Ointment 1 Application(s) Topical every 4 hours  zinc sulfate 220 milliGRAM(s) Oral daily    MEDICATIONS  (PRN):  acetaminophen   Tablet 650 milliGRAM(s) Oral every 4 hours PRN For Temp greater than 38.5 C (101.3 F)  acetaminophen   Tablet. 650 milliGRAM(s) Oral every 6 hours PRN Mild Pain (1 - 3)  benzocaine 15 mG/menthol 3.6 mG Lozenge 1 Lozenge Oral every 3 hours PRN Sore Throat  bisacodyl 5 milliGRAM(s) Oral daily PRN Constipation  dextrose Gel 1 Dose(s) Oral once PRN Blood Glucose LESS THAN 70 milliGRAM(s)/deciliter  diphenhydrAMINE   Injectable 25 milliGRAM(s) IV Push at bedtime PRN Insomnia  glucagon  Injectable 1 milliGRAM(s) IntraMuscular once PRN Glucose LESS THAN 70 milligrams/deciliter  morphine  - Injectable 2 milliGRAM(s) IV Push every 4 hours PRN Moderate Pain (4 - 6)  ondansetron Injectable 4 milliGRAM(s) IV Push every 6 hours PRN Nausea  oxyCODONE    5 mG/acetaminophen 325 mG 1 Tablet(s) Oral every 4 hours PRN Severe Pain (7 - 10)  risperiDONE   Tablet 0.25 milliGRAM(s) Oral daily PRN aggitation  senna 2 Tablet(s) Oral at bedtime PRN Constipation

## 2018-02-25 NOTE — PROGRESS NOTE ADULT - ASSESSMENT
ASSESSMENT/ PLAN:  CONDITION : Guarded   TENS: with > 60% involvement. Improving. Continue IVIg ,  local wound care, moth and eye care.  GI / NUTRITION: poor po intake due to oral mucosa involvement. Continue TF  RENAL: CKD. Stable.  Continue monitoring uo and creatinine. Limit IVF  GENERAL: improving Continue pain mgmt, VTE and GI prophylaxis  Cont OT/ PT ASSESSMENT/ PLAN:  CONDITION : Guarded   TENS: with > 60% involvement. Improving. Continue IVIg to complete course today  Continue local wound care, mouth and eye care.  GI / NUTRITION: poor po intake due to oral mucosa involvement. Continue TF  RENAL: CKD. Stable.  Continue monitoring uo and creatinine. Limit IVF  GENERAL: improving Continue pain mgmt, VTE and GI prophylaxis  DM : BS controlled. Continue monitoring and regimen  Cont OT/ PT

## 2018-02-25 NOTE — PROGRESS NOTE ADULT - ATTENDING COMMENTS
35 minutes patient care and counseling
Pt's status and continuing are discussed with son present at bedside

## 2018-02-25 NOTE — PROGRESS NOTE ADULT - SUBJECTIVE AND OBJECTIVE BOX
INTERVAL HISTORY:  Stable overnight with obvious clinical improvement. Ambulated with PT with increased stamina today   Events past 24 hrs***  Vital Signs Last 24 Hrs  T(C): 35.7 (25 Feb 2018 08:32), Max: 36.7 (24 Feb 2018 23:35)  T(F): 96.3 (25 Feb 2018 08:32), Max: 98.1 (24 Feb 2018 23:35)  HR: 78 (25 Feb 2018 08:32) (74 - 80)  BP: 151/74 (25 Feb 2018 08:32) (151/74 - 172/81)  BP(mean): --  RR: 18 (25 Feb 2018 08:32) (18 - 18)  SpO2: --      24 Feb 2018 07:01  -  25 Feb 2018 07:00  --------------------------------------------------------  IN: 3255 mL / OUT: 1870 mL / NET: 1385 mL    25 Feb 2018 07:01  -  25 Feb 2018 12:37  --------------------------------------------------------  IN: 760 mL / OUT: 300 mL / NET: 460 mL      IVF : NS @ 75 cc/hr     MEDICATIONS  (STANDING):  amLODIPine   Tablet 10 milliGRAM(s) Oral at bedtime    Allergies    allopurinol (Rash)  shellfish (Rash)    Intolerances        Lab Results:                        9.9    8.88  )-----------( 192      ( 24 Feb 2018 18:57 )             30.8     02-24    140  |  114<H>  |  51<H>  ----------------------------<  160<H>  4.7   |  21  |  2.6<H>    Ca    8.1<L>      24 Feb 2018 18:57  Phos  3.5     02-24  Mg     1.7     02-24    TPro  5.9<L>  /  Alb  2.1<L>  /  TBili  0.5  /  DBili  x   /  AST  27  /  ALT  19  /  AlkPhos  74  02-24        LIVER FUNCTIONS - ( 24 Feb 2018 18:57 )  Alb: 2.1 g/dL / Pro: 5.9 g/dL / ALK PHOS: 74 U/L / ALT: 19 U/L / AST: 27 U/L / GGT: x           CAPILLARY BLOOD GLUCOSE  142 (25 Feb 2018 11:45)  183 (25 Feb 2018 08:32)  124 (24 Feb 2018 21:11)  135 (24 Feb 2018 16:17)                IMAGING STUDIES:       PHYSICAL EXAM:    General/Neuro:    HEENT:    Chest: clear BS    Cardiovascular:    Abdomen:    Genitalia:    Extremities:    Skin/Wounds: INTERVAL HISTORY:  Stable overnight with obvious clinical improvement. Ambulated with PT with increased stamina today   Pt has no specific complaints. States he feels better. Son acknowledges overall improvement.   Events past 24 hrs***  Vital Signs Last 24 Hrs  T(C): 35.7 (25 Feb 2018 08:32), Max: 36.7 (24 Feb 2018 23:35)  T(F): 96.3 (25 Feb 2018 08:32), Max: 98.1 (24 Feb 2018 23:35)  HR: 78 (25 Feb 2018 08:32) (74 - 80)  BP: 151/74 (25 Feb 2018 08:32) (151/74 - 172/81)  BP(mean): --  RR: 18 (25 Feb 2018 08:32) (18 - 18)  SpO2: --      24 Feb 2018 07:01  -  25 Feb 2018 07:00  --------------------------------------------------------  IN: 3255 mL / OUT: 1870 mL / NET: 1385 mL    25 Feb 2018 07:01  -  25 Feb 2018 12:37  --------------------------------------------------------  IN: 760 mL / OUT: 300 mL / NET: 460 mL      IVF : NS @ 75 cc/hr     MEDICATIONS  (STANDING):  amLODIPine   Tablet 10 milliGRAM(s) Oral at bedtime    Allergies    allopurinol (Rash)  shellfish (Rash)    Intolerances    Lab Results:                        9.9    8.88  )-----------( 192      ( 24 Feb 2018 18:57 )             30.8     02-24    140  |  114<H>  |  51<H>  ----------------------------<  160<H>  4.7   |  21  |  2.6<H>    Ca    8.1<L>      24 Feb 2018 18:57  Phos  3.5     02-24  Mg     1.7     02-24    TPro  5.9<L>  /  Alb  2.1<L>  /  TBili  0.5  /  DBili  x   /  AST  27  /  ALT  19  /  AlkPhos  74  02-24        LIVER FUNCTIONS - ( 24 Feb 2018 18:57 )  Alb: 2.1 g/dL / Pro: 5.9 g/dL / ALK PHOS: 74 U/L / ALT: 19 U/L / AST: 27 U/L / GGT: x           CAPILLARY BLOOD GLUCOSE  142 (25 Feb 2018 11:45)  183 (25 Feb 2018 08:32)  124 (24 Feb 2018 21:11)  135 (24 Feb 2018 16:17)      PHYSICAL EXAM:    General/Neuro: awake alert, responds to questions    HEENT: dark  crusted blood on lips, fading rash erythema; eyes clearer    Chest: clear BS    Cardiovascular: reg    Abdomen: drying rash scattered small dried blood over open wounds    Extremities: drying rash, decreased tenderness of palms and soles

## 2018-02-26 LAB
-  AMIKACIN: SIGNIFICANT CHANGE UP
-  AMPICILLIN/SULBACTAM: SIGNIFICANT CHANGE UP
-  AMPICILLIN: SIGNIFICANT CHANGE UP
-  AZTREONAM: SIGNIFICANT CHANGE UP
-  CEFAZOLIN: SIGNIFICANT CHANGE UP
-  CEFEPIME: SIGNIFICANT CHANGE UP
-  CEFOXITIN: SIGNIFICANT CHANGE UP
-  CEFTAZIDIME: SIGNIFICANT CHANGE UP
-  CEFTRIAXONE: SIGNIFICANT CHANGE UP
-  CIPROFLOXACIN: SIGNIFICANT CHANGE UP
-  ERTAPENEM: SIGNIFICANT CHANGE UP
-  GENTAMICIN: SIGNIFICANT CHANGE UP
-  GENTAMICIN: SIGNIFICANT CHANGE UP
-  IMIPENEM: SIGNIFICANT CHANGE UP
-  LEVOFLOXACIN: SIGNIFICANT CHANGE UP
-  MEROPENEM: SIGNIFICANT CHANGE UP
-  PIPERACILLIN/TAZOBACTAM: SIGNIFICANT CHANGE UP
-  TOBRAMYCIN: SIGNIFICANT CHANGE UP
-  TOBRAMYCIN: SIGNIFICANT CHANGE UP
-  TRIMETHOPRIM/SULFAMETHOXAZOLE: SIGNIFICANT CHANGE UP
CULTURE RESULTS: SIGNIFICANT CHANGE UP
METHOD TYPE: SIGNIFICANT CHANGE UP
ORGANISM # SPEC MICROSCOPIC CNT: SIGNIFICANT CHANGE UP
SPECIMEN SOURCE: SIGNIFICANT CHANGE UP

## 2018-02-26 RX ORDER — HYDROCORTISONE 1 %
1 OINTMENT (GRAM) TOPICAL AT BEDTIME
Qty: 0 | Refills: 0 | Status: DISCONTINUED | OUTPATIENT
Start: 2018-02-26 | End: 2018-03-01

## 2018-02-26 RX ADMIN — HEPARIN SODIUM 5000 UNIT(S): 5000 INJECTION INTRAVENOUS; SUBCUTANEOUS at 14:17

## 2018-02-26 RX ADMIN — Medication 6: at 11:55

## 2018-02-26 RX ADMIN — Medication 200 MILLIGRAM(S): at 06:27

## 2018-02-26 RX ADMIN — Medication 1 DROP(S): at 11:28

## 2018-02-26 RX ADMIN — Medication 650 MILLIGRAM(S): at 21:28

## 2018-02-26 RX ADMIN — ZINC SULFATE TAB 220 MG (50 MG ZINC EQUIVALENT) 220 MILLIGRAM(S): 220 (50 ZN) TAB at 11:29

## 2018-02-26 RX ADMIN — HEPARIN SODIUM 5000 UNIT(S): 5000 INJECTION INTRAVENOUS; SUBCUTANEOUS at 06:26

## 2018-02-26 RX ADMIN — Medication 500 MILLIGRAM(S): at 11:29

## 2018-02-26 RX ADMIN — Medication 1 APPLICATION(S): at 11:46

## 2018-02-26 RX ADMIN — Medication 1 DROP(S): at 06:27

## 2018-02-26 RX ADMIN — DIPHENHYDRAMINE HYDROCHLORIDE AND LIDOCAINE HYDROCHLORIDE AND ALUMINUM HYDROXIDE AND MAGNESIUM HYDRO 30 MILLILITER(S): KIT at 21:32

## 2018-02-26 RX ADMIN — Medication 1 SPRAY(S): at 21:32

## 2018-02-26 RX ADMIN — HEPARIN SODIUM 5000 UNIT(S): 5000 INJECTION INTRAVENOUS; SUBCUTANEOUS at 21:30

## 2018-02-26 RX ADMIN — Medication 100 MILLIGRAM(S): at 21:29

## 2018-02-26 RX ADMIN — Medication 4: at 17:29

## 2018-02-26 RX ADMIN — Medication 650 MILLIGRAM(S): at 06:27

## 2018-02-26 RX ADMIN — Medication 325 MILLIGRAM(S): at 17:30

## 2018-02-26 RX ADMIN — Medication 200 MILLIGRAM(S): at 21:32

## 2018-02-26 RX ADMIN — TOBRAMYCIN AND DEXAMETHASONE 1 APPLICATION(S): 1; 3 SUSPENSION/ DROPS OPHTHALMIC at 06:28

## 2018-02-26 RX ADMIN — Medication 1 APPLICATION(S): at 01:56

## 2018-02-26 RX ADMIN — PANTOPRAZOLE SODIUM 40 MILLIGRAM(S): 20 TABLET, DELAYED RELEASE ORAL at 11:47

## 2018-02-26 RX ADMIN — Medication 200 MILLIGRAM(S): at 14:16

## 2018-02-26 RX ADMIN — Medication 325 MILLIGRAM(S): at 11:31

## 2018-02-26 RX ADMIN — Medication 325 MILLIGRAM(S): at 11:28

## 2018-02-26 RX ADMIN — Medication 1 APPLICATION(S): at 17:31

## 2018-02-26 RX ADMIN — TOBRAMYCIN AND DEXAMETHASONE 1 APPLICATION(S): 1; 3 SUSPENSION/ DROPS OPHTHALMIC at 17:31

## 2018-02-26 RX ADMIN — AMLODIPINE BESYLATE 10 MILLIGRAM(S): 2.5 TABLET ORAL at 21:28

## 2018-02-26 RX ADMIN — Medication 2: at 11:27

## 2018-02-26 RX ADMIN — Medication 1 DROP(S): at 17:30

## 2018-02-26 RX ADMIN — PANTOPRAZOLE SODIUM 40 MILLIGRAM(S): 20 TABLET, DELAYED RELEASE ORAL at 06:28

## 2018-02-26 RX ADMIN — CLOPIDOGREL BISULFATE 75 MILLIGRAM(S): 75 TABLET, FILM COATED ORAL at 11:29

## 2018-02-26 RX ADMIN — Medication 1 APPLICATION(S): at 06:28

## 2018-02-26 RX ADMIN — Medication 81 MILLIGRAM(S): at 11:31

## 2018-02-26 RX ADMIN — Medication 1 APPLICATION(S): at 14:18

## 2018-02-26 RX ADMIN — Medication 1 APPLICATION(S): at 21:31

## 2018-02-26 RX ADMIN — TOBRAMYCIN AND DEXAMETHASONE 1 APPLICATION(S): 1; 3 SUSPENSION/ DROPS OPHTHALMIC at 11:30

## 2018-02-26 RX ADMIN — Medication 1 SUPPOSITORY(S): at 22:25

## 2018-02-26 RX ADMIN — Medication 650 MILLIGRAM(S): at 14:17

## 2018-02-26 NOTE — PROGRESS NOTE ADULT - SUBJECTIVE AND OBJECTIVE BOX
INTERVAL HISTORY:  Overnight stable No complaints. Improving po intake less oral discomfort  Joshua TF and ambulating   Vital Signs Last 24 Hrs  T(C): 36.1 (26 Feb 2018 15:48), Max: 37.1 (26 Feb 2018 07:50)  T(F): 96.9 (26 Feb 2018 15:48), Max: 98.7 (26 Feb 2018 07:50)  HR: 77 (26 Feb 2018 15:48) (72 - 79)  BP: 169/81 (26 Feb 2018 15:48) (153/74 - 169/81)  RR: 18 (26 Feb 2018 15:48) (18 - 20)      --  I&O's Summary    25 Feb 2018 07:01  -  26 Feb 2018 07:00  --------------------------------------------------------  IN: 3080 mL / OUT: 1040 mL / NET: 2040 mL    26 Feb 2018 07:01  -  26 Feb 2018 17:06  --------------------------------------------------------  IN: 450 mL / OUT: 750 mL / NET: -300 mL        Voiding adequately    Allergies    allopurinol (Rash)  shellfish (Rash)    Intolerances        Lab Results:                        9.8    8.42  )-----------( 193      ( 25 Feb 2018 16:00 )             30.7     02-25    140  |  112<H>  |  53<H>  ----------------------------<  157<H>  4.5   |  24  |  2.7<H>    Ca    8.6      25 Feb 2018 16:00  Phos  3.9     02-25  Mg     2.1     02-25    TPro  6.1  /  Alb  2.1<L>  /  TBili  0.4  /  DBili  x   /  AST  26  /  ALT  21  /  AlkPhos  76  02-25        LIVER FUNCTIONS - ( 25 Feb 2018 16:00 )  Alb: 2.1 g/dL / Pro: 6.1 g/dL / ALK PHOS: 76 U/L / ALT: 21 U/L / AST: 26 U/L / GGT: x           CAPILLARY BLOOD GLUCOSE  208 (26 Feb 2018 15:48)  285 (26 Feb 2018 11:53)  202 (26 Feb 2018 07:50)  112 (25 Feb 2018 21:09)        PHYSICAL EXAM:    General/Neuro: awake alert verbally responsive     HEENT: erythema and patchy scabs of cheeks, fading erythema  of rash -scalp and forehead; crusted bloody lips    Chest: clear lungs    Cardiovascular: reg    Abdomen: fading drying rash, few crusted open areas    Genitalia: unremarkable    Extremities: warm . palms and soles fading erythema and less tender    Skin/Wounds: rash- drying, fading

## 2018-02-26 NOTE — PROGRESS NOTE ADULT - SUBJECTIVE AND OBJECTIVE BOX
Nephrology progress note    Patient is seen and examined, events over the last 24 h noted .  No major events     Allergies:  allopurinol (Rash)  shellfish (Rash)    Hospital Medications:   MEDICATIONS  (STANDING):  amLODIPine   Tablet 10 milliGRAM(s) Oral at bedtime  artificial  tears Solution 1 Drop(s) Both EYES every 6 hours    ascorbic acid 500 milliGRAM(s) Oral daily  aspirin enteric coated 81 milliGRAM(s) Oral daily  benzocaine 20% Spray 1 Spray(s) Topical three times a day  clopidogrel Tablet 75 milliGRAM(s) Oral daily  dextrose 5%. 1000 milliLiter(s) (50 mL/Hr) IV Continuous <Continuous>  docusate sodium 100 milliGRAM(s) Oral three times a day  ferrous sulfate Oral Tab/Cap - Peds 325 milliGRAM(s) Oral three times a day with meals  FIRST- Mouthwash  BLM 30 milliLiter(s) Swish and Spit every 4 hours  heparin  Injectable 5000 Unit(s) SubCutaneous every 8 hours  insulin lispro (HumaLOG) corrective regimen sliding scale   SubCutaneous three times a day before meals  insulin lispro Injectable (HumaLOG). 5 Unit(s) SubCutaneous once  labetalol 200 milliGRAM(s) Oral three times a day  pantoprazole    Tablet 40 milliGRAM(s) Oral before breakfast  pantoprazole   Suspension 40 milliGRAM(s) Oral daily  sodium bicarbonate 650 milliGRAM(s) Oral every 8 hours  sodium chloride 0.9%. 1000 milliLiter(s) (50 mL/Hr) IV Continuous <Continuous>  tobramycin/dexamethasone Ointment 1 Application(s) Both EYES every 6 hours  vitamin A &amp; D Ointment 1 Application(s) Topical every 4 hours  zinc sulfate 220 milliGRAM(s) Oral daily        VITALS:  T(F): 98.7 (18 @ 07:50), Max: 98.7 (18 @ 07:50)  HR: 72 (18 @ 07:50)  BP: 153/74 (18 @ 07:50)  RR: 20 (18 @ 07:50)  SpO2: 99% (18 @ 16:04)       @ 07:01  -   @ 07:00  --------------------------------------------------------  IN: 3255 mL / OUT: 1870 mL / NET: 1385 mL     @ 07: @ 07:00  --------------------------------------------------------  IN: 3080 mL / OUT: 1040 mL / NET: 2040 mL     @ 07: @ 13:17  --------------------------------------------------------  IN: 300 mL / OUT: 450 mL / NET: -150 mL          PHYSICAL EXAM:  Constitutional: NAD  HEENT: anicteric sclera, oropharynx clear, MMM  Neck: No JVD  Respiratory: CTAB, no wheezes, rales or rhonchi  Cardiovascular: S1, S2, RRR  Gastrointestinal: BS+, soft, NT/ND  Extremities: No cyanosis or clubbing. No peripheral edema  :  No espinal.   Skin: diffuse skin rashes and erythema     LABS:      140  |  112<H>  |  53<H>  ----------------------------<  157<H>  4.5   |  24  |  2.7<H>    Ca    8.6      2018 16:00  Phos  3.9       Mg     2.1         TPro  6.1  /  Alb  2.1<L>  /  TBili  0.4  /  DBili      /  AST  26  /  ALT  21  /  AlkPhos  76       Creatinine Trend: 2.7<--, 2.6<--, 2.7<--, 3.0<--, 3.4<--, 4.0<--                     9.8    8.42  )-----------( 193      ( 2018 16:00 )             30.7       Urine Studies:  Urinalysis Basic - ( 2018 00:56 )    Color: Yellow / Appearance: Clear / S.015 / pH:   Gluc:  / Ketone: Negative  / Bili: Negative / Urobili: 0.2 mg/dL   Blood:  / Protein: >=300 mg/dL / Nitrite: Negative   Leuk Esterase: Negative / RBC: 1-2 /HPF / WBC    Sq Epi:  / Non Sq Epi:  / Bacteria:       Creatinine, Random Urine: 66 mg/dL ( @ 00:56)  Protein/Creatinine Ratio Calculation: 3.3 Ratio ( @ 00:56)    RADIOLOGY & ADDITIONAL STUDIES:

## 2018-02-26 NOTE — CONSULT NOTE ADULT - SUBJECTIVE AND OBJECTIVE BOX
MEHNAZ NICOLE  MRN-[id number]  62y Male      Patient is a 62y old  Male who presents with a chief complaint of Rash on body (20 Feb 2018 05:11)    HEALTH ISSUES - R/O PROBLEM Dx:    HPI:  61M with h/o HTN,HLD,DM2,CKD, gastritis , CVA , arthritis admitted to the Burn Unit for Flowers Kurt syndrome , a transfer from an outside hospital.  As per the transfer note and the patient’s son account, his sx started a few days ago with a generalized pruritic rash associated with eye pain, sore throat and generalized weakness. Initially the rash was located over his flank now evolved over his back, extremities trunk and the oral mucosa.  Patient was started on allopurinol about 3-4 weeks ago for suspicion of gout arthritis that he has been tolerated well until the onset of the rash. In the ED at the transferring hospital , he was seen by dermatology , a skin biopsy was performed, also received one dose of IV steroids as per dermatology recommendations. (19 Feb 2018 23:26)      Extended HPI: Pt evaluated to follow up on pseudomembranous conjuncitivitis 2' SJS. Pt reports improvement in comfort and vision; reports less discharge from both eye. Pt denies acute change in vision.  (-)burning, itching, tearing OD/OS  (-)flashes, floaters, eye pain OD/OS    PAST MEDICAL & SURGICAL HISTORY:  Diverticulosis  Gastritis  Hyperlipidemia  CVA (cerebral vascular accident)  Anemia in chronic illness  Diabetes mellitus with end-stage renal disease  Benign hypertension with CKD (chronic kidney disease) stage IV  HTN (hypertension)  H/O colonoscopy  H/O endoscopy    MEDICATIONS  (STANDING):  amLODIPine   Tablet 10 milliGRAM(s) Oral at bedtime  artificial  tears Solution 1 Drop(s) Both EYES every 6 hours  ascorbic acid      ascorbic acid 500 milliGRAM(s) Oral daily  aspirin enteric coated 81 milliGRAM(s) Oral daily  benzocaine 20% Spray 1 Spray(s) Topical three times a day  clopidogrel Tablet 75 milliGRAM(s) Oral daily  dextrose 5%. 1000 milliLiter(s) (50 mL/Hr) IV Continuous <Continuous>  dextrose 50% Injectable 12.5 Gram(s) IV Push once  dextrose 50% Injectable 25 Gram(s) IV Push once  dextrose 50% Injectable 25 Gram(s) IV Push once  docusate sodium 100 milliGRAM(s) Oral three times a day  ferrous sulfate Oral Tab/Cap - Peds 325 milliGRAM(s) Oral three times a day with meals  FIRST- Mouthwash  BLM 30 milliLiter(s) Swish and Spit every 4 hours  heparin  Injectable 5000 Unit(s) SubCutaneous every 8 hours  insulin lispro (HumaLOG) corrective regimen sliding scale   SubCutaneous three times a day before meals  insulin lispro Injectable (HumaLOG). 5 Unit(s) SubCutaneous once  labetalol 200 milliGRAM(s) Oral three times a day  pantoprazole    Tablet 40 milliGRAM(s) Oral before breakfast  pantoprazole   Suspension 40 milliGRAM(s) Oral daily  sodium bicarbonate 650 milliGRAM(s) Oral every 8 hours  sodium chloride 0.9%. 1000 milliLiter(s) (50 mL/Hr) IV Continuous <Continuous>  tobramycin/dexamethasone Ointment 1 Application(s) Both EYES every 6 hours  vitamin A &amp; D Ointment 1 Application(s) Topical every 4 hours  zinc sulfate 220 milliGRAM(s) Oral daily    MEDICATIONS  (PRN):  acetaminophen   Tablet 650 milliGRAM(s) Oral every 4 hours PRN For Temp greater than 38.5 C (101.3 F)  acetaminophen   Tablet. 650 milliGRAM(s) Oral every 6 hours PRN Mild Pain (1 - 3)  benzocaine 15 mG/menthol 3.6 mG Lozenge 1 Lozenge Oral every 3 hours PRN Sore Throat  bisacodyl 5 milliGRAM(s) Oral daily PRN Constipation  dextrose Gel 1 Dose(s) Oral once PRN Blood Glucose LESS THAN 70 milliGRAM(s)/deciliter  diphenhydrAMINE   Injectable 25 milliGRAM(s) IV Push at bedtime PRN Insomnia  glucagon  Injectable 1 milliGRAM(s) IntraMuscular once PRN Glucose LESS THAN 70 milligrams/deciliter  morphine  - Injectable 2 milliGRAM(s) IV Push every 4 hours PRN Moderate Pain (4 - 6)  ondansetron Injectable 4 milliGRAM(s) IV Push every 6 hours PRN Nausea  oxyCODONE    5 mG/acetaminophen 325 mG 1 Tablet(s) Oral every 4 hours PRN Severe Pain (7 - 10)  risperiDONE   Tablet 0.25 milliGRAM(s) Oral daily PRN aggitation  senna 2 Tablet(s) Oral at bedtime PRN Constipation    Allergies  allopurinol (Rash)  shellfish (Rash)    Intolerances        POHx:  ENRRIQUE: 3 days  -refractive error  - diabetic retinopathy s/p (focal?) laser  - anatomically narrow angle s/p LPI OU  (-)injuries/surgeries    Ocular Medications:   tobramycin/dexamethasone Ointment 1 Application(s) Both EYES every 6 hours  artificial  tears Solution 1 Drop(s) Both EYES every 6 hours    FOHx: Non-contributory    VISUAL ACUITY  OD: 20/ sc/cc PH: 20/  OS 20/ sc/cc PH: 20/    NEURO TESTING  Pupils: 	PERRL, (-) APD OD/OS  EOMS: 	FULL OD/OS  CVF: 	Full to red light OD/OS    ANTERIOR SEGMENT EVALUATION:   lids/lashes: 	trace erythema, tr mucoid discharge OD/OS  conjunctiva: 	1+ injection OD/OS  cornea: 	clear, (-)epi defect OD/OS  anterior chamber: deep & formed OD/OS  iris: 	flat and even OD/OS    INTRAOCULAR PRESSURE  Goldmann Applanation Tonometry/Tonopen  OD: 18mmHg  OS: 17mmHg  @ 1:55pm    POSTERIOR SEGMENT EVALUATION - deferred MEHNAZ NICOLE  MRN-[id number]  62y Male      Patient is a 62y old  Male who presents with a chief complaint of Rash on body (20 Feb 2018 05:11)    HEALTH ISSUES - R/O PROBLEM Dx:    HPI:  61M with h/o HTN,HLD,DM2,CKD, gastritis , CVA , arthritis admitted to the Burn Unit for Flowers Kurt syndrome , a transfer from an outside hospital.  As per the transfer note and the patient’s son account, his sx started a few days ago with a generalized pruritic rash associated with eye pain, sore throat and generalized weakness. Initially the rash was located over his flank now evolved over his back, extremities trunk and the oral mucosa.  Patient was started on allopurinol about 3-4 weeks ago for suspicion of gout arthritis that he has been tolerated well until the onset of the rash. In the ED at the transferring hospital , he was seen by dermatology , a skin biopsy was performed, also received one dose of IV steroids as per dermatology recommendations. (19 Feb 2018 23:26)      Extended HPI: Pt evaluated to follow up on pseudomembranous conjuncitivitis 2' SJS. Pt reports improvement in comfort and vision; reports less discharge from both eye. Pt denies acute change in vision.  (-)burning, itching, tearing OD/OS  (-)flashes, floaters, eye pain OD/OS    PAST MEDICAL & SURGICAL HISTORY:  Diverticulosis  Gastritis  Hyperlipidemia  CVA (cerebral vascular accident)  Anemia in chronic illness  Diabetes mellitus with end-stage renal disease  Benign hypertension with CKD (chronic kidney disease) stage IV  HTN (hypertension)  H/O colonoscopy  H/O endoscopy    MEDICATIONS  (STANDING):  amLODIPine   Tablet 10 milliGRAM(s) Oral at bedtime  artificial  tears Solution 1 Drop(s) Both EYES every 6 hours  ascorbic acid      ascorbic acid 500 milliGRAM(s) Oral daily  aspirin enteric coated 81 milliGRAM(s) Oral daily  benzocaine 20% Spray 1 Spray(s) Topical three times a day  clopidogrel Tablet 75 milliGRAM(s) Oral daily  dextrose 5%. 1000 milliLiter(s) (50 mL/Hr) IV Continuous <Continuous>  dextrose 50% Injectable 12.5 Gram(s) IV Push once  dextrose 50% Injectable 25 Gram(s) IV Push once  dextrose 50% Injectable 25 Gram(s) IV Push once  docusate sodium 100 milliGRAM(s) Oral three times a day  ferrous sulfate Oral Tab/Cap - Peds 325 milliGRAM(s) Oral three times a day with meals  FIRST- Mouthwash  BLM 30 milliLiter(s) Swish and Spit every 4 hours  heparin  Injectable 5000 Unit(s) SubCutaneous every 8 hours  insulin lispro (HumaLOG) corrective regimen sliding scale   SubCutaneous three times a day before meals  insulin lispro Injectable (HumaLOG). 5 Unit(s) SubCutaneous once  labetalol 200 milliGRAM(s) Oral three times a day  pantoprazole    Tablet 40 milliGRAM(s) Oral before breakfast  pantoprazole   Suspension 40 milliGRAM(s) Oral daily  sodium bicarbonate 650 milliGRAM(s) Oral every 8 hours  sodium chloride 0.9%. 1000 milliLiter(s) (50 mL/Hr) IV Continuous <Continuous>  tobramycin/dexamethasone Ointment 1 Application(s) Both EYES every 6 hours  vitamin A &amp; D Ointment 1 Application(s) Topical every 4 hours  zinc sulfate 220 milliGRAM(s) Oral daily    MEDICATIONS  (PRN):  acetaminophen   Tablet 650 milliGRAM(s) Oral every 4 hours PRN For Temp greater than 38.5 C (101.3 F)  acetaminophen   Tablet. 650 milliGRAM(s) Oral every 6 hours PRN Mild Pain (1 - 3)  benzocaine 15 mG/menthol 3.6 mG Lozenge 1 Lozenge Oral every 3 hours PRN Sore Throat  bisacodyl 5 milliGRAM(s) Oral daily PRN Constipation  dextrose Gel 1 Dose(s) Oral once PRN Blood Glucose LESS THAN 70 milliGRAM(s)/deciliter  diphenhydrAMINE   Injectable 25 milliGRAM(s) IV Push at bedtime PRN Insomnia  glucagon  Injectable 1 milliGRAM(s) IntraMuscular once PRN Glucose LESS THAN 70 milligrams/deciliter  morphine  - Injectable 2 milliGRAM(s) IV Push every 4 hours PRN Moderate Pain (4 - 6)  ondansetron Injectable 4 milliGRAM(s) IV Push every 6 hours PRN Nausea  oxyCODONE    5 mG/acetaminophen 325 mG 1 Tablet(s) Oral every 4 hours PRN Severe Pain (7 - 10)  risperiDONE   Tablet 0.25 milliGRAM(s) Oral daily PRN aggitation  senna 2 Tablet(s) Oral at bedtime PRN Constipation    Allergies  allopurinol (Rash)  shellfish (Rash)    Intolerances        POHx:  ENRRIQUE: 3 days  -refractive error  - diabetic retinopathy s/p (focal?) laser  - anatomically narrow angle s/p LPI OU  (-)injuries/surgeries    Ocular Medications:   tobramycin/dexamethasone Ointment 1 Application(s) Both EYES every 6 hours  artificial  tears Solution 1 Drop(s) Both EYES every 6 hours    FOHx: Non-contributory    VISUAL ACUITY (with near card)  OD: 20/40 sc  OS 20/70+ sc    NEURO TESTING  Pupils: 	PERRL, (-) APD OD/OS  EOMS: 	FULL OD/OS  CVF: 	Full to red light OD/OS    ANTERIOR SEGMENT EVALUATION:   lids/lashes: 	trace erythema, tr mucoid discharge OD/OS  conjunctiva: 	trace injection, (-)pseudomembrane/symblepharon OD/OS  cornea: 	clear, (-)epi defect OD/OS  anterior chamber: deep & formed OD/OS  iris: 	flat and even, (-)NVI, PI OD/OS    INTRAOCULAR PRESSURE  Goldmann Applanation Tonometry/Tonopen  OD: 18mmHg  OS: 17mmHg  @ 1:55pm    POSTERIOR SEGMENT EVALUATION - deferred

## 2018-02-26 NOTE — PROGRESS NOTE ADULT - ASSESSMENT
63 yo man with PMH CKD 4 , HTN and Dm presenting for a transfer from [facility] for SJS    ·	CKD 4 stable  - creat is trending down  ·	creat has been around 4, repeat around 3  was told he may need HD soon  ·	No need for RRT for now  ·	on NaHCO3 po continue  650 mg q8h  ·	on IVF (poor po intake)  continue follow strict I and O  ·	IVF as per [facility] team   ·	CKD MBD , hyperphosphatemia on  sevelamer pleasecontinue and increase to 1 tab po q8h check PTH  ·	HTN -on Amlodipine 10 mg q24h and labetalol keep current   ·	SJS on tobramycin/dexamthasone ointment  no antibiotics     will follow

## 2018-02-26 NOTE — PROGRESS NOTE ADULT - ASSESSMENT
ASSESSMENT/ PLAN:  CONDITION : guarded ; improving   Resolving TENS- IVIg course completed last pm  Continue local wound care, mouth care  Optometry f/u appreciated, rec noted  CKD - stable continue regimen  GI/ nutrition- improving po, continue TF  ID: Review cxs- multiple orga noted . No clinical infection   Continue pain mgmt, VTE and GI prophylaxis  Cont PT ambulation  Discharge planning.

## 2018-02-26 NOTE — CONSULT NOTE ADULT - ASSESSMENT
1. SJS with associated pseudomembranous conjunctivitis  - significant improvement in clinical picture  - [facility]  - Pt educated; should follow up with [facility] eye clinic in 1 week as an outpatient, or with current eye care provider in [place]  - Pt educated on importance of follow up since tobradex has topical steroid that can elevated intra-ocular pressure - expressed understanding    2. h/o Non proliferative Diabetic retinopathy with likely clinically significant macular edema OU s/p focal laser OD  - not evaluated today  - Pt to f/u with current eye care provider (in [place]) as an outpatient.    3. h/o Anatomically Narrow Angle s/p LPI OU  - stable IOP and anterior chamber  - Pt to follow up with current eye care provider

## 2018-02-26 NOTE — CHART NOTE - NSCHARTNOTEFT_GEN_A_CORE
Oral mucosa much improved and now able to tolerate PO diet  NGT remains in place. NG feeds held today after intake very good at breakfast. Currently eating lunch with no complaints  IVIG completed 2/25    ICU Vital Signs Last 24 Hrs  T(F): 98.7 (26 Feb 2018 07:50), Max: 98.7 (26 Feb 2018 07:50)  HR: 72 (26 Feb 2018 07:50) (72 - 79)  BP: 153/74 (26 Feb 2018 07:50) (153/74 - 167/83)  RR: 20 (26 Feb 2018 07:50) (18 - 20)  SpO2: 99% (25 Feb 2018 16:04) (99% - 99%)    I&O's Detail    25 Feb 2018 07:01  -  26 Feb 2018 07:00  --------------------------------------------------------  IN:    Enteral Tube Flush: 450 mL    Glucerna: 1430 mL    IV PiggyBack: 250 mL    sodium chloride 0.9%.: 950 mL  Total IN: 3080 mL    OUT:    Voided: 1040 mL  Total OUT: 1040 mL    Total NET: 2040 mL    IVF's: NS@50ml/hr  Diet: Mechanical soft with nectar thick liquids  Glucerna 1.2 360ml X 4 feeds/day via NGT  Miguel Angel 2 packs/day    02-25    140  |  112<H>  |  53<H>  ----------------------------<  157<H>  4.5   |  24  |  2.7<H>    Ca    8.6      25 Feb 2018 16:00  Phos  3.9     02-25  Mg     2.1     02-25    TPro  6.1  /  Alb  2.1<L>  /  TBili  0.4  /  DBili  x   /  AST  26  /  ALT  21  /  AlkPhos  76  02-25                        9.8    8.42  )-----------( 193      ( 25 Feb 2018 16:00 )             30.7     A/P: Clinically improved and now tolerating PO diet with good intake. Would keep NGT in for next 24 hrs (to give meds prn). If intake remains good throughout today and on 2/26 then re-eval need for NGT. Change timing of NG feeds to pc & hs. If consumes >75% of meal then hold NG feed after that meal. Continue Miguel Angel 2 packs/day, offer PO and if not able to drink then give via NGT.

## 2018-02-27 DIAGNOSIS — K62.5 HEMORRHAGE OF ANUS AND RECTUM: ICD-10-CM

## 2018-02-27 LAB
ALBUMIN SERPL ELPH-MCNC: 2.3 G/DL — LOW (ref 3–5.5)
ALP SERPL-CCNC: 91 U/L — SIGNIFICANT CHANGE UP (ref 30–115)
ALT FLD-CCNC: 26 U/L — SIGNIFICANT CHANGE UP (ref 0–41)
ANION GAP SERPL CALC-SCNC: 6 MMOL/L — LOW (ref 7–14)
AST SERPL-CCNC: 54 U/L — HIGH (ref 0–41)
BILIRUB SERPL-MCNC: 0.5 MG/DL — SIGNIFICANT CHANGE UP (ref 0.2–1.2)
BLD GP AB SCN SERPL QL: SIGNIFICANT CHANGE UP
BUN SERPL-MCNC: 59 MG/DL — HIGH (ref 10–20)
CALCIUM SERPL-MCNC: 8.1 MG/DL — LOW (ref 8.5–10.1)
CHLORIDE SERPL-SCNC: 109 MMOL/L — SIGNIFICANT CHANGE UP (ref 98–110)
CO2 SERPL-SCNC: 22 MMOL/L — SIGNIFICANT CHANGE UP (ref 17–32)
CREAT SERPL-MCNC: 3 MG/DL — HIGH (ref 0.7–1.5)
GLUCOSE SERPL-MCNC: 180 MG/DL — HIGH (ref 70–110)
HCT VFR BLD CALC: 28.2 % — LOW (ref 42–52)
HCT VFR BLD CALC: 29.5 % — LOW (ref 42–52)
HGB BLD-MCNC: 9.1 G/DL — LOW (ref 14–18)
HGB BLD-MCNC: 9.3 G/DL — LOW (ref 14–18)
MAGNESIUM SERPL-MCNC: 1.6 MG/DL — LOW (ref 1.8–2.4)
MCHC RBC-ENTMCNC: 27.3 PG — SIGNIFICANT CHANGE UP (ref 27–31)
MCHC RBC-ENTMCNC: 27.4 PG — SIGNIFICANT CHANGE UP (ref 27–31)
MCHC RBC-ENTMCNC: 31.5 G/DL — LOW (ref 32–37)
MCHC RBC-ENTMCNC: 32.3 G/DL — SIGNIFICANT CHANGE UP (ref 32–37)
MCV RBC AUTO: 84.9 FL — SIGNIFICANT CHANGE UP (ref 80–94)
MCV RBC AUTO: 86.5 FL — SIGNIFICANT CHANGE UP (ref 80–94)
NRBC # BLD: 0 /100 WBCS — SIGNIFICANT CHANGE UP (ref 0–0)
NRBC # BLD: 0 /100 WBCS — SIGNIFICANT CHANGE UP (ref 0–0)
PLATELET # BLD AUTO: 180 K/UL — SIGNIFICANT CHANGE UP (ref 130–400)
PLATELET # BLD AUTO: 185 K/UL — SIGNIFICANT CHANGE UP (ref 130–400)
POTASSIUM SERPL-MCNC: 5 MMOL/L — SIGNIFICANT CHANGE UP (ref 3.5–5)
POTASSIUM SERPL-SCNC: 5 MMOL/L — SIGNIFICANT CHANGE UP (ref 3.5–5)
PROT SERPL-MCNC: 6.2 G/DL — SIGNIFICANT CHANGE UP (ref 6–8)
RBC # BLD: 3.32 M/UL — LOW (ref 4.7–6.1)
RBC # BLD: 3.41 M/UL — LOW (ref 4.7–6.1)
RBC # FLD: 15.1 % — HIGH (ref 11.5–14.5)
RBC # FLD: 15.3 % — HIGH (ref 11.5–14.5)
SODIUM SERPL-SCNC: 137 MMOL/L — SIGNIFICANT CHANGE UP (ref 135–146)
TYPE + AB SCN PNL BLD: SIGNIFICANT CHANGE UP
WBC # BLD: 7.14 K/UL — SIGNIFICANT CHANGE UP (ref 4.8–10.8)
WBC # BLD: 9.16 K/UL — SIGNIFICANT CHANGE UP (ref 4.8–10.8)
WBC # FLD AUTO: 7.14 K/UL — SIGNIFICANT CHANGE UP (ref 4.8–10.8)
WBC # FLD AUTO: 9.16 K/UL — SIGNIFICANT CHANGE UP (ref 4.8–10.8)

## 2018-02-27 RX ORDER — SEVELAMER CARBONATE 2400 MG/1
800 POWDER, FOR SUSPENSION ORAL
Qty: 0 | Refills: 0 | Status: DISCONTINUED | OUTPATIENT
Start: 2018-02-27 | End: 2018-03-01

## 2018-02-27 RX ADMIN — PANTOPRAZOLE SODIUM 40 MILLIGRAM(S): 20 TABLET, DELAYED RELEASE ORAL at 08:34

## 2018-02-27 RX ADMIN — Medication 8: at 12:05

## 2018-02-27 RX ADMIN — Medication 1 DROP(S): at 12:11

## 2018-02-27 RX ADMIN — SEVELAMER CARBONATE 800 MILLIGRAM(S): 2400 POWDER, FOR SUSPENSION ORAL at 08:37

## 2018-02-27 RX ADMIN — SEVELAMER CARBONATE 800 MILLIGRAM(S): 2400 POWDER, FOR SUSPENSION ORAL at 17:59

## 2018-02-27 RX ADMIN — Medication 200 MILLIGRAM(S): at 05:47

## 2018-02-27 RX ADMIN — HEPARIN SODIUM 5000 UNIT(S): 5000 INJECTION INTRAVENOUS; SUBCUTANEOUS at 14:30

## 2018-02-27 RX ADMIN — Medication 650 MILLIGRAM(S): at 05:45

## 2018-02-27 RX ADMIN — Medication 1 APPLICATION(S): at 12:10

## 2018-02-27 RX ADMIN — AMLODIPINE BESYLATE 10 MILLIGRAM(S): 2.5 TABLET ORAL at 21:13

## 2018-02-27 RX ADMIN — Medication 325 MILLIGRAM(S): at 17:57

## 2018-02-27 RX ADMIN — TOBRAMYCIN AND DEXAMETHASONE 1 APPLICATION(S): 1; 3 SUSPENSION/ DROPS OPHTHALMIC at 00:15

## 2018-02-27 RX ADMIN — Medication 1 DROP(S): at 18:00

## 2018-02-27 RX ADMIN — Medication 650 MILLIGRAM(S): at 21:13

## 2018-02-27 RX ADMIN — Medication 650 MILLIGRAM(S): at 14:30

## 2018-02-27 RX ADMIN — Medication 1 APPLICATION(S): at 21:15

## 2018-02-27 RX ADMIN — Medication 200 MILLIGRAM(S): at 14:33

## 2018-02-27 RX ADMIN — Medication 325 MILLIGRAM(S): at 18:00

## 2018-02-27 RX ADMIN — ZINC SULFATE TAB 220 MG (50 MG ZINC EQUIVALENT) 220 MILLIGRAM(S): 220 (50 ZN) TAB at 12:11

## 2018-02-27 RX ADMIN — TOBRAMYCIN AND DEXAMETHASONE 1 APPLICATION(S): 1; 3 SUSPENSION/ DROPS OPHTHALMIC at 23:39

## 2018-02-27 RX ADMIN — DIPHENHYDRAMINE HYDROCHLORIDE AND LIDOCAINE HYDROCHLORIDE AND ALUMINUM HYDROXIDE AND MAGNESIUM HYDRO 30 MILLILITER(S): KIT at 21:17

## 2018-02-27 RX ADMIN — Medication 1 APPLICATION(S): at 05:45

## 2018-02-27 RX ADMIN — TOBRAMYCIN AND DEXAMETHASONE 1 APPLICATION(S): 1; 3 SUSPENSION/ DROPS OPHTHALMIC at 18:00

## 2018-02-27 RX ADMIN — Medication 100 MILLIGRAM(S): at 21:14

## 2018-02-27 RX ADMIN — Medication 1 DROP(S): at 05:46

## 2018-02-27 RX ADMIN — TOBRAMYCIN AND DEXAMETHASONE 1 APPLICATION(S): 1; 3 SUSPENSION/ DROPS OPHTHALMIC at 05:48

## 2018-02-27 RX ADMIN — DIPHENHYDRAMINE HYDROCHLORIDE AND LIDOCAINE HYDROCHLORIDE AND ALUMINUM HYDROXIDE AND MAGNESIUM HYDRO 30 MILLILITER(S): KIT at 05:47

## 2018-02-27 RX ADMIN — HEPARIN SODIUM 5000 UNIT(S): 5000 INJECTION INTRAVENOUS; SUBCUTANEOUS at 21:15

## 2018-02-27 RX ADMIN — Medication 1 APPLICATION(S): at 14:32

## 2018-02-27 RX ADMIN — Medication 100 MILLIGRAM(S): at 14:30

## 2018-02-27 RX ADMIN — Medication 4: at 17:58

## 2018-02-27 RX ADMIN — Medication 200 MILLIGRAM(S): at 21:13

## 2018-02-27 RX ADMIN — TOBRAMYCIN AND DEXAMETHASONE 1 APPLICATION(S): 1; 3 SUSPENSION/ DROPS OPHTHALMIC at 12:11

## 2018-02-27 RX ADMIN — Medication 25 MILLIGRAM(S): at 21:20

## 2018-02-27 RX ADMIN — Medication 325 MILLIGRAM(S): at 08:35

## 2018-02-27 RX ADMIN — CLOPIDOGREL BISULFATE 75 MILLIGRAM(S): 75 TABLET, FILM COATED ORAL at 12:11

## 2018-02-27 RX ADMIN — HEPARIN SODIUM 5000 UNIT(S): 5000 INJECTION INTRAVENOUS; SUBCUTANEOUS at 05:47

## 2018-02-27 RX ADMIN — Medication 1 APPLICATION(S): at 05:48

## 2018-02-27 RX ADMIN — DIPHENHYDRAMINE HYDROCHLORIDE AND LIDOCAINE HYDROCHLORIDE AND ALUMINUM HYDROXIDE AND MAGNESIUM HYDRO 30 MILLILITER(S): KIT at 12:02

## 2018-02-27 RX ADMIN — Medication 1 APPLICATION(S): at 18:00

## 2018-02-27 RX ADMIN — DIPHENHYDRAMINE HYDROCHLORIDE AND LIDOCAINE HYDROCHLORIDE AND ALUMINUM HYDROXIDE AND MAGNESIUM HYDRO 30 MILLILITER(S): KIT at 18:01

## 2018-02-27 RX ADMIN — Medication 100 MILLIGRAM(S): at 05:45

## 2018-02-27 RX ADMIN — Medication 1 SPRAY(S): at 14:32

## 2018-02-27 RX ADMIN — Medication 81 MILLIGRAM(S): at 12:11

## 2018-02-27 RX ADMIN — Medication 1 SUPPOSITORY(S): at 21:42

## 2018-02-27 RX ADMIN — SODIUM CHLORIDE 50 MILLILITER(S): 9 INJECTION INTRAMUSCULAR; INTRAVENOUS; SUBCUTANEOUS at 05:43

## 2018-02-27 RX ADMIN — Medication 1 DROP(S): at 23:36

## 2018-02-27 RX ADMIN — Medication 1 DROP(S): at 00:15

## 2018-02-27 RX ADMIN — DIPHENHYDRAMINE HYDROCHLORIDE AND LIDOCAINE HYDROCHLORIDE AND ALUMINUM HYDROXIDE AND MAGNESIUM HYDRO 30 MILLILITER(S): KIT at 14:32

## 2018-02-27 RX ADMIN — Medication 500 MILLIGRAM(S): at 12:11

## 2018-02-27 RX ADMIN — SEVELAMER CARBONATE 800 MILLIGRAM(S): 2400 POWDER, FOR SUSPENSION ORAL at 12:09

## 2018-02-27 NOTE — CONSULT NOTE ADULT - ASSESSMENT
62 yo M with PMHx of hemorrhoids, gastritis, diverticulosis admitted for SJS with BRBPR a/w rectal pain.  Hemodynamically stable with downward trending hemoglobin (9.1). Most likely ddx hemorrhoids    1. BRBPR  rectal exam without evidence of active bleeding  trend CBC  pain control  consider intervention if symptoms persist 62 yo M with PMHx of hemorrhoids, gastritis, diverticulosis admitted for SJS with BRBPR a/w rectal pain.  Hemodynamically stable with downward trending hemoglobin (9.1). Most likely ddx hemorrhoids    1. BRBPR  possible hemorrhoidal bleed  no signs of active gi bleeding   trend CBC  anusol hc qhs   sitz baths q12h   avoid constipation, laxaives, stool softeners 62 yo M with PMHx of hemorrhoids, gastritis, diverticulosis admitted for SJS with BRBPR a/w rectal pain.  Hemodynamically stable with downward trending hemoglobin (9.1). Most likely ddx hemorrhoids    1. BRBPR  possible hemorrhoidal bleed  no signs of active gi bleeding   trend CBC  anusol hc qhs   sitz baths q12h   avoid constipation, laxaives, stool softeners   Obtain Colonoscopy report

## 2018-02-27 NOTE — CONSULT NOTE ADULT - ASSESSMENT
62 year old man with Isaiah Kurt's having fresh blood per rectum.    No surgical intervention indicated at present.  Trend hemoglobin, if dropping for GI to perform colonoscopy

## 2018-02-27 NOTE — PROGRESS NOTE ADULT - SUBJECTIVE AND OBJECTIVE BOX
Nephrology progress note    Patient is seen and examined, events over the last 24 h noted .  Denied any complaints  No SOB no chest pain no other events     Allergies:  allopurinol (Rash)  shellfish (Rash)    Hospital Medications:   MEDICATIONS  (STANDING):  amLODIPine   Tablet 10 milliGRAM(s) Oral at bedtime  artificial  tears Solution 1 Drop(s) Both EYES every 6 hours     ascorbic acid 500 milliGRAM(s) Oral daily  aspirin enteric coated 81 milliGRAM(s) Oral daily  benzocaine 20% Spray 1 Spray(s) Topical three times a day  clopidogrel Tablet 75 milliGRAM(s) Oral daily  docusate sodium 100 milliGRAM(s) Oral three times a day  ferrous sulfate Oral Tab/Cap - Peds 325 milliGRAM(s) Oral three times a day with meals  FIRST- Mouthwash  BLM 30 milliLiter(s) Swish and Spit every 4 hours  heparin  Injectable 5000 Unit(s) SubCutaneous every 8 hours  hydrocortisone hemorrhoidal Suppository 1 Suppository(s) Rectal at bedtime  insulin lispro (HumaLOG) corrective regimen sliding scale   SubCutaneous three times a day before meals  labetalol 200 milliGRAM(s) Oral three times a day  pantoprazole    Tablet 40 milliGRAM(s) Oral before breakfast  sevelamer hydrochloride 800 milliGRAM(s) Oral three times a day with meals  sodium bicarbonate 650 milliGRAM(s) Oral every 8 hours  sodium chloride 0.9%. 1000 milliLiter(s) (50 mL/Hr) IV Continuous <Continuous>  tobramycin/dexamethasone Ointment 1 Application(s) Both EYES every 6 hours  vitamin A &amp; D Ointment 1 Application(s) Topical every 4 hours  zinc sulfate 220 milliGRAM(s) Oral daily        VITALS:  T(F): 96.9 (18 @ 07:43), Max: 98.7 (18 @ 19:32)  HR: 78 (18 @ 07:43)  BP: 138/66 (18 @ 07:43)  RR: 18 (18 @ 07:43)  SpO2: 100% (18 @ 19:32)     @ 07:01  -   @ 07:00  --------------------------------------------------------  IN: 3080 mL / OUT: 1040 mL / NET: 2040 mL     @ 07:01  -   @ 07:00  --------------------------------------------------------  IN: 1390 mL / OUT: 1450 mL / NET: -60 mL          PHYSICAL EXAM:  Constitutional: NAD  HEENT: anicteric sclera, oropharynx clear, MMM  Neck: No JVD  Respiratory: CTAB, no wheezes, rales or rhonchi  Cardiovascular: S1, S2, RRR  Gastrointestinal: BS+, soft, NT/ND  Extremities: No cyanosis or clubbing. No peripheral edema  :  No espinal.   Skin: diffuse skin rashes and blisters     LABS:      140  |  112<H>  |  53<H>  ----------------------------<  157<H>  4.5   |  24  |  2.7<H>      Ca    8.6      2018 16:00  Phos  3.9       Mg     2.1       Creatinine Trend: 2.7<--, 2.6<--, 2.7<--, 3.0<--, 3.4<--, 4.0<--  TPro  6.1  /  Alb  2.1<L>  /  TBili  0.4  /  DBili      /  AST  26  /  ALT  21  /  AlkPhos  76                            9.1    9.16  )-----------( 180      ( 2018 00:24 )             28.2       Urine Studies:  Urinalysis Basic - ( 2018 00:56 )    Color: Yellow / Appearance: Clear / S.015 / pH:   Gluc:  / Ketone: Negative  / Bili: Negative / Urobili: 0.2 mg/dL   Blood:  / Protein: >=300 mg/dL / Nitrite: Negative   Leuk Esterase: Negative / RBC: 1-2 /HPF / WBC    Sq Epi:  / Non Sq Epi:  / Bacteria:       Creatinine, Random Urine: 66 mg/dL ( @ 00:56)  Protein/Creatinine Ratio Calculation: 3.3 Ratio ( @ 00:56)

## 2018-02-27 NOTE — PROGRESS NOTE ADULT - ASSESSMENT
63 yo man with PMH CKD 4 , HTN and Dm presenting for a transfer from [facility] for SJS    ·	CKD 4 stable  - creat is trending down  ·	creat has been around 4, repeat around 3  was told he may need HD soon  ·	No need for RRT for now  ·	on NaHCO3 po continue  650 mg q8h  ·	IVF as per [facility] team     ·	CKD MBD , hyperphosphatemia on  sevelamer please continue and increase to 1 tab po q8h check PTH  ·	HTN -on Amlodipine 10 mg q24h and labetalol keep current   ·	SJS on tobramycin/dexamthasone ointment  no antibiotics     will follow

## 2018-02-27 NOTE — CONSULT NOTE ADULT - SUBJECTIVE AND OBJECTIVE BOX
MEHNAZ NICOLE [id number]  62y Male    HPI:  61M with h/o HTN,HLD,DM2,CKD, gastritis , CVA , arthritis admitted to the Burn Unit for Flowers Kurt syndrome , a transfer from an outside hospital.  He was noted to have bloody bowel movement for the last few days. Blood was fresh red, not mixed in with the stool. Patient did not describe any abdominal discomfort and continues to open his bowels regularly. He last had a colonoscopy a few months ago at [facility] and there was no abnormal findings according to patient's son. Patient was taking plavix at home for previous CVA but that has been held at present. Currently patient is asymptomatic from the blood loss. Hb is a little lower compared to baseline but patient's son noted that his father has stage IV CKD and it is fairly common for him to be anemic.       PAST MEDICAL & SURGICAL HISTORY:  Diverticulosis  Gastritis  Hyperlipidemia  CVA (cerebral vascular accident)  Anemia in chronic illness  Diabetes mellitus with end-stage renal disease  Benign hypertension with CKD (chronic kidney disease) stage IV  HTN (hypertension)  H/O colonoscopy  H/O endoscopy        MEDICATIONS  (STANDING):  amLODIPine   Tablet 10 milliGRAM(s) Oral at bedtime  artificial  tears Solution 1 Drop(s) Both EYES every 6 hours  ascorbic acid      ascorbic acid 500 milliGRAM(s) Oral daily  aspirin enteric coated 81 milliGRAM(s) Oral daily  benzocaine 20% Spray 1 Spray(s) Topical three times a day  clopidogrel Tablet 75 milliGRAM(s) Oral daily  dextrose 5%. 1000 milliLiter(s) (50 mL/Hr) IV Continuous <Continuous>  dextrose 50% Injectable 12.5 Gram(s) IV Push once  dextrose 50% Injectable 25 Gram(s) IV Push once  dextrose 50% Injectable 25 Gram(s) IV Push once  docusate sodium 100 milliGRAM(s) Oral three times a day  ferrous sulfate Oral Tab/Cap - Peds 325 milliGRAM(s) Oral three times a day with meals  FIRST- Mouthwash  BLM 30 milliLiter(s) Swish and Spit every 4 hours  heparin  Injectable 5000 Unit(s) SubCutaneous every 8 hours  hydrocortisone hemorrhoidal Suppository 1 Suppository(s) Rectal at bedtime  insulin lispro (HumaLOG) corrective regimen sliding scale   SubCutaneous three times a day before meals  labetalol 200 milliGRAM(s) Oral three times a day  pantoprazole    Tablet 40 milliGRAM(s) Oral before breakfast  pantoprazole   Suspension 40 milliGRAM(s) Oral daily  sevelamer hydrochloride 800 milliGRAM(s) Oral three times a day with meals  sodium bicarbonate 650 milliGRAM(s) Oral every 8 hours  sodium chloride 0.9%. 1000 milliLiter(s) (50 mL/Hr) IV Continuous <Continuous>  tobramycin/dexamethasone Ointment 1 Application(s) Both EYES every 6 hours  vitamin A &amp; D Ointment 1 Application(s) Topical every 4 hours  zinc sulfate 220 milliGRAM(s) Oral daily    MEDICATIONS  (PRN):  acetaminophen   Tablet 650 milliGRAM(s) Oral every 4 hours PRN For Temp greater than 38.5 C (101.3 F)  acetaminophen   Tablet. 650 milliGRAM(s) Oral every 6 hours PRN Mild Pain (1 - 3)  benzocaine 15 mG/menthol 3.6 mG Lozenge 1 Lozenge Oral every 3 hours PRN Sore Throat  bisacodyl 5 milliGRAM(s) Oral daily PRN Constipation  dextrose Gel 1 Dose(s) Oral once PRN Blood Glucose LESS THAN 70 milliGRAM(s)/deciliter  diphenhydrAMINE   Injectable 25 milliGRAM(s) IV Push at bedtime PRN Insomnia  glucagon  Injectable 1 milliGRAM(s) IntraMuscular once PRN Glucose LESS THAN 70 milligrams/deciliter  morphine  - Injectable 2 milliGRAM(s) IV Push every 4 hours PRN Moderate Pain (4 - 6)  ondansetron Injectable 4 milliGRAM(s) IV Push every 6 hours PRN Nausea  risperiDONE   Tablet 0.25 milliGRAM(s) Oral daily PRN aggitation  senna 2 Tablet(s) Oral at bedtime PRN Constipation      Allergies    allopurinol (Rash)  shellfish (Rash)    Intolerances        REVIEW OF SYSTEMS    [ ] A ten-point review of systems was otherwise negative except as noted.  [ ] Due to altered mental status/intubation, subjective information were not able to be obtained from the patient. History was obtained, to the extent possible, from review of the chart and collateral sources of information.      Vital Signs Last 24 Hrs  T(C): 35.6 (27 Feb 2018 16:02), Max: 37.1 (26 Feb 2018 19:32)  T(F): 96 (27 Feb 2018 16:02), Max: 98.7 (26 Feb 2018 19:32)  HR: 72 (27 Feb 2018 16:02) (72 - 78)  BP: 129/65 (27 Feb 2018 16:02) (129/65 - 157/74)  BP(mean): --  RR: 18 (27 Feb 2018 16:02) (18 - 18)  SpO2: 100% (26 Feb 2018 19:32) (100% - 100%)    PHYSICAL EXAM:  GENERAL: NAD, well-appearing  CHEST/LUNG: Clear to auscultation bilaterally  HEART: Regular rate and rhythm  ABDOMEN: Soft, Nontender, Nondistended; Bowel sounds present. Rectal exam benign. No active bleeding. No external hemorrhoids. No blood on glove. No masses to palpate.   EXTREMITIES:  No clubbing, cyanosis, or edema      LABS:  Labs:  CAPILLARY BLOOD GLUCOSE  210 (27 Feb 2018 16:02)  210 (27 Feb 2018 15:58)  303 (27 Feb 2018 11:47)  146 (27 Feb 2018 08:16)  133 (26 Feb 2018 21:37)                              9.1    9.16  )-----------( 180      ( 27 Feb 2018 00:24 )             28.2                 LFTs:         Coags:

## 2018-02-27 NOTE — CONSULT NOTE ADULT - SUBJECTIVE AND OBJECTIVE BOX
Patient is a 62y old  Male who presents with a chief complaint of Rash on body (20 Feb 2018 05:11)      HPI:    62yo M with h/o HTN,HLD,DM2,CKD, gastritis , CVA , arthritis admitted to the Burn Unit for Flowers Kurt syndrome , a transfer from an outside hospital.  As per the transfer note and the patient’s son account, his sx started a few days ago with a generalized pruritic rash associated with eye pain, sore throat and generalized weakness. Initially the rash was located over his flank now evolved over his back, extremities trunk and the oral mucosa.  Patient was started on allopurinol about 3-4 weeks ago for suspicion of gout arthritis that he has been tolerated well until the onset of the rash.  During hospitalization pt complained of rectal pain and BRBPR x 1day.  As per son, pt has had a hx of hemorrhoids without intervention.  Last EGD and colonoscopy at [facility] 11/17 demonstrating gastritis, diverticulosis, hemorrhoids.  Pt denies N,V,D dizziness, weakness, abd pain, changing in appetite. Denies family h/o colon cancer.     ROS wnl except as above    PAST MEDICAL & SURGICAL HISTORY:  Diverticulosis  Gastritis  Hyperlipidemia  CVA (cerebral vascular accident)  Anemia in chronic illness  Diabetes mellitus with end-stage renal disease  Benign hypertension with CKD (chronic kidney disease) stage IV  HTN (hypertension)  H/O colonoscopy  H/O endoscopy      Home Medications:  amLODIPine 5 mg oral tablet: 1 tab(s) orally once a day (at bedtime) (19 Feb 2018 22:29)  Aspirin Enteric Coated 81 mg oral delayed release tablet: 1 tab(s) orally once a day (19 Feb 2018 22:33)  atorvastatin 80 mg oral tablet: 1 tab(s) orally once a day (19 Feb 2018 22:34)  bisacodyl 5 mg oral delayed release tablet: 1 tab(s) orally once a day (at bedtime) (19 Feb 2018 22:34)  clopidogrel 75 mg oral tablet: 1 tab(s) orally once a day (19 Feb 2018 22:35)  darbepoetin iggy: 40 microgram(s) injectable every 10 days (19 Feb 2018 22:32)  ergocalciferol: 63857 international unit(s) buccal once a week on friday  (19 Feb 2018 22:38)  ferrous sulfate 325 mg (65 mg elemental iron) oral tablet: 1 tab(s) orally 3 times a day (19 Feb 2018 22:39)  insulin glargine: 20 unit(s) subcutaneous once a day (in the morning) (19 Feb 2018 22:40)  insulin glargine 100 units/mL subcutaneous solution: 10 unit(s) subcutaneous once a day (at bedtime) (19 Feb 2018 22:41)  labetalol 200 mg oral tablet: 1 tab(s) orally 2 times a day (19 Feb 2018 22:42)  pantoprazole 40 mg oral delayed release tablet: 1 tab(s) orally once a day (19 Feb 2018 22:43)  [place] Caps oral capsule: 1 cap(s) orally once a day (19 Feb 2018 22:43)  Senna 8.6 mg oral tablet: 2 tab(s) orally once a day (at bedtime) (19 Feb 2018 22:44)  sodium bicarbonate 650 mg oral tablet: 1 tab(s) orally 2 times a day (19 Feb 2018 22:45)      MEDICATIONS  (STANDING):  amLODIPine   Tablet 10 milliGRAM(s) Oral at bedtime  artificial  tears Solution 1 Drop(s) Both EYES every 6 hours  ascorbic acid      ascorbic acid 500 milliGRAM(s) Oral daily  aspirin enteric coated 81 milliGRAM(s) Oral daily  benzocaine 20% Spray 1 Spray(s) Topical three times a day  clopidogrel Tablet 75 milliGRAM(s) Oral daily  dextrose 5%. 1000 milliLiter(s) (50 mL/Hr) IV Continuous <Continuous>  dextrose 50% Injectable 12.5 Gram(s) IV Push once  dextrose 50% Injectable 25 Gram(s) IV Push once  dextrose 50% Injectable 25 Gram(s) IV Push once  docusate sodium 100 milliGRAM(s) Oral three times a day  ferrous sulfate Oral Tab/Cap - Peds 325 milliGRAM(s) Oral three times a day with meals  FIRST- Mouthwash  BLM 30 milliLiter(s) Swish and Spit every 4 hours  heparin  Injectable 5000 Unit(s) SubCutaneous every 8 hours  hydrocortisone hemorrhoidal Suppository 1 Suppository(s) Rectal at bedtime  insulin lispro (HumaLOG) corrective regimen sliding scale   SubCutaneous three times a day before meals  labetalol 200 milliGRAM(s) Oral three times a day  pantoprazole    Tablet 40 milliGRAM(s) Oral before breakfast  pantoprazole   Suspension 40 milliGRAM(s) Oral daily  sevelamer hydrochloride 800 milliGRAM(s) Oral three times a day with meals  sodium bicarbonate 650 milliGRAM(s) Oral every 8 hours  sodium chloride 0.9%. 1000 milliLiter(s) (50 mL/Hr) IV Continuous <Continuous>  tobramycin/dexamethasone Ointment 1 Application(s) Both EYES every 6 hours  vitamin A &amp; D Ointment 1 Application(s) Topical every 4 hours  zinc sulfate 220 milliGRAM(s) Oral daily    MEDICATIONS  (PRN):  acetaminophen   Tablet 650 milliGRAM(s) Oral every 4 hours PRN For Temp greater than 38.5 C (101.3 F)  acetaminophen   Tablet. 650 milliGRAM(s) Oral every 6 hours PRN Mild Pain (1 - 3)  benzocaine 15 mG/menthol 3.6 mG Lozenge 1 Lozenge Oral every 3 hours PRN Sore Throat  bisacodyl 5 milliGRAM(s) Oral daily PRN Constipation  dextrose Gel 1 Dose(s) Oral once PRN Blood Glucose LESS THAN 70 milliGRAM(s)/deciliter  diphenhydrAMINE   Injectable 25 milliGRAM(s) IV Push at bedtime PRN Insomnia  glucagon  Injectable 1 milliGRAM(s) IntraMuscular once PRN Glucose LESS THAN 70 milligrams/deciliter  morphine  - Injectable 2 milliGRAM(s) IV Push every 4 hours PRN Moderate Pain (4 - 6)  ondansetron Injectable 4 milliGRAM(s) IV Push every 6 hours PRN Nausea  risperiDONE   Tablet 0.25 milliGRAM(s) Oral daily PRN aggitation  senna 2 Tablet(s) Oral at bedtime PRN Constipation      Allergies    allopurinol (Rash)  shellfish (Rash)    Intolerances        FAMILY HISTORY:  No pertinent family history in first degree relatives      SOCIAL    REVIEW OF SYSTEMS    Vital Signs Last 24 Hrs  T(C): 36.1 (27 Feb 2018 07:43), Max: 37.1 (26 Feb 2018 19:32)  T(F): 96.9 (27 Feb 2018 07:43), Max: 98.7 (26 Feb 2018 19:32)  HR: 78 (27 Feb 2018 07:43) (77 - 78)  BP: 138/66 (27 Feb 2018 07:43) (138/66 - 169/81)  BP(mean): --  RR: 18 (27 Feb 2018 07:43) (18 - 18)  SpO2: 100% (26 Feb 2018 19:32) (100% - 100%)    GENERAL:  no distress  HEENT:  erythema and patchy scabs of cheeks, fading erythema  of rash -scalp and forehead; crusted bloody lips  CHEST:   no increased effort, breath sounds clear  HEART:  Regular rhythm  ABDOMEN:  fading drying rash, few crusted open areas, Soft, non-tender, non-distended, normoactive bowel sounds,  no masses ,no hepato-splenomegaly, no signs of chronic liver disease  EXTEREMITIES:  no cyanosis  Skin: generalized rash drying, fading  Rectal: no masses      CBC Full  -  ( 27 Feb 2018 00:24 )  WBC Count : 9.16 K/uL  Hemoglobin : 9.1 g/dL  Hematocrit : 28.2 %  Platelet Count - Automated : 180 K/uL  Mean Cell Volume : 84.9 fL  Mean Cell Hemoglobin : 27.4 pg  Mean Cell Hemoglobin Concentration : 32.3 g/dL  Auto Neutrophil # : x  Auto Lymphocyte # : x  Auto Monocyte # : x  Auto Eosinophil # : x  Auto Basophil # : x  Auto Neutrophil % : x  Auto Lymphocyte % : x  Auto Monocyte % : x  Auto Eosinophil % : x  Auto Basophil % : x      Hemoglobin: 9.1 g/dL (02-27-18 @ 00:24)  Hemoglobin: 9.8 g/dL (02-25-18 @ 16:00)  Alkaline Phosphatase, Serum: 76 U/L (02-25-18 @ 16:00)  Aspartate Aminotransferase (AST/SGOT): 26 U/L (02-25-18 @ 16:00)  Bilirubin Total, Serum: 0.4 mg/dL (02-25-18 @ 16:00)  Alanine Aminotransferase (ALT/SGPT): 21 U/L (02-25-18 @ 16:00)  Hemoglobin: 9.9 g/dL (02-24-18 @ 18:57)  Alanine Aminotransferase (ALT/SGPT): 19 U/L (02-24-18 @ 18:57)  Bilirubin Total, Serum: 0.5 mg/dL (02-24-18 @ 18:57)  Aspartate Aminotransferase (AST/SGOT): 27 U/L (02-24-18 @ 18:57)  Alkaline Phosphatase, Serum: 74 U/L (02-24-18 @ 18:57)          02-25    140  |  112<H>  |  53<H>  ----------------------------<  157<H>  4.5   |  24  |  2.7<H>    Ca    8.6      25 Feb 2018 16:00  Phos  3.9     02-25  Mg     2.1     02-25    TPro  6.1  /  Alb  2.1<L>  /  TBili  0.4  /  DBili  x   /  AST  26  /  ALT  21  /  AlkPhos  76  02-25              RADIOLOGY

## 2018-02-27 NOTE — CONSULT NOTE ADULT - PROBLEM SELECTOR RECOMMENDATION 9
No surgical intervention indicated at present.  Trend hemoglobin, if dropping for GI to perform colonoscopy

## 2018-02-28 LAB
ANION GAP SERPL CALC-SCNC: 5 MMOL/L — LOW (ref 7–14)
BUN SERPL-MCNC: 50 MG/DL — HIGH (ref 10–20)
CALCIUM SERPL-MCNC: 7.9 MG/DL — LOW (ref 8.5–10.1)
CHLORIDE SERPL-SCNC: 109 MMOL/L — SIGNIFICANT CHANGE UP (ref 98–110)
CO2 SERPL-SCNC: 22 MMOL/L — SIGNIFICANT CHANGE UP (ref 17–32)
CREAT SERPL-MCNC: 3.2 MG/DL — HIGH (ref 0.7–1.5)
GLUCOSE SERPL-MCNC: 218 MG/DL — HIGH (ref 70–110)
HCT VFR BLD CALC: 28.3 % — LOW (ref 42–52)
HGB BLD-MCNC: 9 G/DL — LOW (ref 14–18)
MAGNESIUM SERPL-MCNC: 1.6 MG/DL — LOW (ref 1.8–2.4)
MCHC RBC-ENTMCNC: 27.3 PG — SIGNIFICANT CHANGE UP (ref 27–31)
MCHC RBC-ENTMCNC: 31.8 G/DL — LOW (ref 32–37)
MCV RBC AUTO: 85.8 FL — SIGNIFICANT CHANGE UP (ref 80–94)
NRBC # BLD: 0 /100 WBCS — SIGNIFICANT CHANGE UP (ref 0–0)
PHOSPHATE SERPL-MCNC: 3.6 MG/DL — SIGNIFICANT CHANGE UP (ref 2.1–4.9)
PLATELET # BLD AUTO: 172 K/UL — SIGNIFICANT CHANGE UP (ref 130–400)
POTASSIUM SERPL-MCNC: 4.9 MMOL/L — SIGNIFICANT CHANGE UP (ref 3.5–5)
POTASSIUM SERPL-SCNC: 4.9 MMOL/L — SIGNIFICANT CHANGE UP (ref 3.5–5)
RBC # BLD: 3.3 M/UL — LOW (ref 4.7–6.1)
RBC # FLD: 15.2 % — HIGH (ref 11.5–14.5)
SODIUM SERPL-SCNC: 136 MMOL/L — SIGNIFICANT CHANGE UP (ref 135–146)
WBC # BLD: 8.05 K/UL — SIGNIFICANT CHANGE UP (ref 4.8–10.8)
WBC # FLD AUTO: 8.05 K/UL — SIGNIFICANT CHANGE UP (ref 4.8–10.8)

## 2018-02-28 RX ORDER — MAGNESIUM SULFATE 500 MG/ML
2 VIAL (ML) INJECTION ONCE
Qty: 0 | Refills: 0 | Status: COMPLETED | OUTPATIENT
Start: 2018-02-28 | End: 2018-02-28

## 2018-02-28 RX ADMIN — DIPHENHYDRAMINE HYDROCHLORIDE AND LIDOCAINE HYDROCHLORIDE AND ALUMINUM HYDROXIDE AND MAGNESIUM HYDRO 30 MILLILITER(S): KIT at 18:16

## 2018-02-28 RX ADMIN — Medication 2: at 18:20

## 2018-02-28 RX ADMIN — Medication 50 GRAM(S): at 21:07

## 2018-02-28 RX ADMIN — Medication 200 MILLIGRAM(S): at 21:32

## 2018-02-28 RX ADMIN — DIPHENHYDRAMINE HYDROCHLORIDE AND LIDOCAINE HYDROCHLORIDE AND ALUMINUM HYDROXIDE AND MAGNESIUM HYDRO 30 MILLILITER(S): KIT at 18:19

## 2018-02-28 RX ADMIN — Medication 81 MILLIGRAM(S): at 13:10

## 2018-02-28 RX ADMIN — Medication 650 MILLIGRAM(S): at 21:25

## 2018-02-28 RX ADMIN — Medication 100 MILLIGRAM(S): at 05:13

## 2018-02-28 RX ADMIN — HEPARIN SODIUM 5000 UNIT(S): 5000 INJECTION INTRAVENOUS; SUBCUTANEOUS at 05:13

## 2018-02-28 RX ADMIN — Medication 1 SUPPOSITORY(S): at 21:26

## 2018-02-28 RX ADMIN — PANTOPRAZOLE SODIUM 40 MILLIGRAM(S): 20 TABLET, DELAYED RELEASE ORAL at 07:14

## 2018-02-28 RX ADMIN — DIPHENHYDRAMINE HYDROCHLORIDE AND LIDOCAINE HYDROCHLORIDE AND ALUMINUM HYDROXIDE AND MAGNESIUM HYDRO 30 MILLILITER(S): KIT at 11:12

## 2018-02-28 RX ADMIN — ZINC SULFATE TAB 220 MG (50 MG ZINC EQUIVALENT) 220 MILLIGRAM(S): 220 (50 ZN) TAB at 13:09

## 2018-02-28 RX ADMIN — Medication 1 DROP(S): at 23:09

## 2018-02-28 RX ADMIN — Medication 1 APPLICATION(S): at 01:27

## 2018-02-28 RX ADMIN — Medication 200 MILLIGRAM(S): at 05:13

## 2018-02-28 RX ADMIN — HEPARIN SODIUM 5000 UNIT(S): 5000 INJECTION INTRAVENOUS; SUBCUTANEOUS at 14:40

## 2018-02-28 RX ADMIN — Medication 325 MILLIGRAM(S): at 09:06

## 2018-02-28 RX ADMIN — Medication 100 MILLIGRAM(S): at 14:40

## 2018-02-28 RX ADMIN — DIPHENHYDRAMINE HYDROCHLORIDE AND LIDOCAINE HYDROCHLORIDE AND ALUMINUM HYDROXIDE AND MAGNESIUM HYDRO 30 MILLILITER(S): KIT at 05:13

## 2018-02-28 RX ADMIN — Medication 1 DROP(S): at 18:17

## 2018-02-28 RX ADMIN — Medication 1 APPLICATION(S): at 11:13

## 2018-02-28 RX ADMIN — Medication 1 APPLICATION(S): at 21:24

## 2018-02-28 RX ADMIN — Medication 325 MILLIGRAM(S): at 13:09

## 2018-02-28 RX ADMIN — Medication 50 GRAM(S): at 21:32

## 2018-02-28 RX ADMIN — Medication 1 APPLICATION(S): at 05:36

## 2018-02-28 RX ADMIN — Medication 650 MILLIGRAM(S): at 14:40

## 2018-02-28 RX ADMIN — Medication 1 DROP(S): at 05:07

## 2018-02-28 RX ADMIN — Medication 1 DROP(S): at 13:05

## 2018-02-28 RX ADMIN — Medication 200 MILLIGRAM(S): at 14:40

## 2018-02-28 RX ADMIN — Medication 1 APPLICATION(S): at 14:40

## 2018-02-28 RX ADMIN — Medication 325 MILLIGRAM(S): at 18:18

## 2018-02-28 RX ADMIN — PANTOPRAZOLE SODIUM 40 MILLIGRAM(S): 20 TABLET, DELAYED RELEASE ORAL at 13:14

## 2018-02-28 RX ADMIN — TOBRAMYCIN AND DEXAMETHASONE 1 APPLICATION(S): 1; 3 SUSPENSION/ DROPS OPHTHALMIC at 05:13

## 2018-02-28 RX ADMIN — Medication 1 SPRAY(S): at 18:15

## 2018-02-28 RX ADMIN — Medication 650 MILLIGRAM(S): at 05:13

## 2018-02-28 RX ADMIN — HEPARIN SODIUM 5000 UNIT(S): 5000 INJECTION INTRAVENOUS; SUBCUTANEOUS at 21:26

## 2018-02-28 RX ADMIN — TOBRAMYCIN AND DEXAMETHASONE 1 APPLICATION(S): 1; 3 SUSPENSION/ DROPS OPHTHALMIC at 23:08

## 2018-02-28 RX ADMIN — SEVELAMER CARBONATE 800 MILLIGRAM(S): 2400 POWDER, FOR SUSPENSION ORAL at 13:09

## 2018-02-28 RX ADMIN — SEVELAMER CARBONATE 800 MILLIGRAM(S): 2400 POWDER, FOR SUSPENSION ORAL at 09:06

## 2018-02-28 RX ADMIN — Medication 500 MILLIGRAM(S): at 13:10

## 2018-02-28 RX ADMIN — TOBRAMYCIN AND DEXAMETHASONE 1 APPLICATION(S): 1; 3 SUSPENSION/ DROPS OPHTHALMIC at 18:18

## 2018-02-28 RX ADMIN — SEVELAMER CARBONATE 800 MILLIGRAM(S): 2400 POWDER, FOR SUSPENSION ORAL at 18:18

## 2018-02-28 RX ADMIN — Medication 100 MILLIGRAM(S): at 21:25

## 2018-02-28 RX ADMIN — DIPHENHYDRAMINE HYDROCHLORIDE AND LIDOCAINE HYDROCHLORIDE AND ALUMINUM HYDROXIDE AND MAGNESIUM HYDRO 30 MILLILITER(S): KIT at 01:26

## 2018-02-28 RX ADMIN — CLOPIDOGREL BISULFATE 75 MILLIGRAM(S): 75 TABLET, FILM COATED ORAL at 13:10

## 2018-02-28 RX ADMIN — Medication 4: at 13:11

## 2018-02-28 RX ADMIN — DIPHENHYDRAMINE HYDROCHLORIDE AND LIDOCAINE HYDROCHLORIDE AND ALUMINUM HYDROXIDE AND MAGNESIUM HYDRO 30 MILLILITER(S): KIT at 21:39

## 2018-02-28 RX ADMIN — TOBRAMYCIN AND DEXAMETHASONE 1 APPLICATION(S): 1; 3 SUSPENSION/ DROPS OPHTHALMIC at 13:14

## 2018-02-28 RX ADMIN — Medication 1 APPLICATION(S): at 18:19

## 2018-02-28 RX ADMIN — AMLODIPINE BESYLATE 10 MILLIGRAM(S): 2.5 TABLET ORAL at 21:25

## 2018-02-28 NOTE — CONSULT NOTE ADULT - PROVIDER SPECIALTY LIST ADULT
Gastroenterology
Nephrology
Nutrition Support
Ophthalmology
Ophthalmology
Physiatry
Gastroenterology
Surgery

## 2018-02-28 NOTE — CONSULT NOTE ADULT - CONSULT REQUESTED DATE/TIME
28-Feb-2018 09:11
20-Feb-2018 15:41
20-Feb-2018 16:54
22-Feb-2018
23-Feb-2018 17:04
26-Feb-2018 14:10
27-Feb-2018 15:01
27-Feb-2018 17:18

## 2018-02-28 NOTE — CONSULT NOTE ADULT - ASSESSMENT
61 yo M with PMHx of diverticulosis, hemorrhoids, gastritis admitted for SJS complains of brbpr x 1day.  Pt has no bloody bowel movements overnight.  No complaints of  N,V,D dizziness, weakness, abd pain, changing in appetite..  Hemoglobin stable overnight 9.1->9.3.      Plan:   1. BRBPR  possible hemorrhoidal bleed  no signs of active gi bleeding   trend CBC  anusol hc qhs   sitz baths q12h   avoid constipation, laxaives, stool softeners 61 yo M with PMHx of diverticulosis, hemorrhoids, gastritis admitted for SJS complains of brbpr x 1day.  Pt has no bloody bowel movements overnight.  No complaints of  N,V,D dizziness, weakness, abd pain, changing in appetite.  Hemoglobin stable overnight 9.1->9.3.        Plan:   1. BRBPR  possible hemorrhoidal bleed  no signs of active gi bleeding 63 yo M with PMHx of diverticulosis, hemorrhoids, gastritis admitted for SJS complains of brbpr x 1day.  Pt has no bloody bowel movements overnight.  No complaints of  N,V,D dizziness, weakness, abd pain, changing in appetite.  Hemoglobin stable overnight 9.1->9.3.        Plan:   1. BRBPR  no signs of active gi bleeding   possible hemorrhoidal bleed  c/w anusol hc qhc  avoid constipation, laxatives, stool softeners as needed   high fiber diet   ambulate patient   call gi as needed

## 2018-02-28 NOTE — PROGRESS NOTE ADULT - ASSESSMENT
61 yo man with PMH CKD 4 , HTN and Dm presenting for a transfer from [facility] for SJS    ·	CKD 4 stable  - creat is trending down  ·	creat has been around 3  was told he may need HD soon  ·	No need for RRT for now  ·	on NaHCO3 po continue  650 mg q8h  ·	IVF as per [facility] team     ·	Anemia acute on chronic no need for MATTHEW , transfuse if Hb <7    ·	CKD MBD , hyperphosphatemia on  sevelamer please continue  PTH noted within target   ·	HTN -on Amlodipine 10 mg q24h and labetalol keep current   ·	SJS on tobramycin/dexamthasone ointment  no antibiotics     will sign off, recall PRN and patient needs follow up as OP with nephrology

## 2018-02-28 NOTE — PROGRESS NOTE ADULT - SUBJECTIVE AND OBJECTIVE BOX
Nephrology progress note    Patient is seen and examined, events over the last 24 h noted .  No new events  Skin rash and ulcers getting better   no other complaints     Allergies:  allopurinol (Rash)  shellfish (Rash)    Hospital Medications:   MEDICATIONS  (STANDING):  amLODIPine   Tablet 10 milliGRAM(s) Oral at bedtime  artificial  tears Solution 1 Drop(s) Both EYES every 6 hours    ascorbic acid 500 milliGRAM(s) Oral daily  aspirin enteric coated 81 milliGRAM(s) Oral daily  benzocaine 20% Spray 1 Spray(s) Topical three times a day  clopidogrel Tablet 75 milliGRAM(s) Oral daily  docusate sodium 100 milliGRAM(s) Oral three times a day  ferrous sulfate Oral Tab/Cap - Peds 325 milliGRAM(s) Oral three times a day with meals  FIRST- Mouthwash  BLM 30 milliLiter(s) Swish and Spit every 4 hours  heparin  Injectable 5000 Unit(s) SubCutaneous every 8 hours  hydrocortisone hemorrhoidal Suppository 1 Suppository(s) Rectal at bedtime  insulin lispro (HumaLOG) corrective regimen sliding scale   SubCutaneous three times a day before meals  labetalol 200 milliGRAM(s) Oral three times a day  magnesium sulfate  IVPB 2 Gram(s) IV Intermittent once  pantoprazole    Tablet 40 milliGRAM(s) Oral before breakfast  sevelamer hydrochloride 800 milliGRAM(s) Oral three times a day with meals  sodium bicarbonate 650 milliGRAM(s) Oral every 8 hours  sodium chloride 0.9%. 1000 milliLiter(s) (50 mL/Hr) IV Continuous <Continuous>  tobramycin/dexamethasone Ointment 1 Application(s) Both EYES every 6 hours  vitamin A &amp; D Ointment 1 Application(s) Topical every 4 hours  zinc sulfate 220 milliGRAM(s) Oral daily        VITALS:  T(F): 98.8 (18 @ 08:46), Max: 98.8 (18 @ 08:46)  HR: 68 (18 @ 08:46)  BP: 131/62 (18 @ 08:46)  RR: 18 (18 @ 08:46)       @ 07:01  -   @ 07:00  --------------------------------------------------------  IN: 1390 mL / OUT: 1450 mL / NET: -60 mL     @ 07:01  -   @ 07:00  --------------------------------------------------------  IN: 1150 mL / OUT: 1100 mL / NET: 50 mL          PHYSICAL EXAM:  Constitutional: NAD  HEENT: anicteric sclera, oropharynx clear, MMM  Neck: No JVD  Respiratory: CTAB, no wheezes, rales or rhonchi  Cardiovascular: S1, S2, RRR  Gastrointestinal: BS+, soft, NT/ND  Extremities: No cyanosis or clubbing. No peripheral edema  :  No espinal.   Skin: diffuse skin rashes     LABS:      137  |  109  |  59<H>  ----------------------------<  180<H>  5.0   |  22  |  3.0<H>    Creatinine Trend: 3.0<--, 2.7<--, 2.6<--, 2.7<--, 3.0<--, 3.4<--    Ca    8.1<L>      2018 17:17  Mg     1.6         TPro  6.2  /  Alb  2.3<L>  /  TBili  0.5  /  DBili      /  AST  54<H>  /  ALT  26  /  AlkPhos  91                            9.3    7.14  )-----------( 185      ( 2018 17:17 )             29.5     Intact PTH: 106:      Urine Studies:  Urinalysis Basic - ( 2018 00:56 )    Color: Yellow / Appearance: Clear / S.015 / pH:   Gluc:  / Ketone: Negative  / Bili: Negative / Urobili: 0.2 mg/dL   Blood:  / Protein: >=300 mg/dL / Nitrite: Negative   Leuk Esterase: Negative / RBC: 1-2 /HPF / WBC    Sq Epi:  / Non Sq Epi:  / Bacteria:       Creatinine, Random Urine: 66 mg/dL ( @ 00:56)  Protein/Creatinine Ratio Calculation: 3.3 Ratio ( @ 00:56)    RADIOLOGY & ADDITIONAL STUDIES:

## 2018-02-28 NOTE — CONSULT NOTE ADULT - SUBJECTIVE AND OBJECTIVE BOX
INTERVAL HPI/OVERNIGHT EVENTS:    63 yo M with PMHx of diverticulosis, hemorrhoids, gastritis admitted for SJS complains of brbpr x 1day.  Pt reports 1 bowel movement overnight without any evidence of blood.  Pt still has some trouble having bowel movements.  No other complaints.       ROS wnl except above    PAST MEDICAL & SURGICAL HISTORY:  Diverticulosis  Gastritis  Hyperlipidemia  CVA (cerebral vascular accident)  Anemia in chronic illness  Diabetes mellitus with end-stage renal disease  Benign hypertension with CKD (chronic kidney disease) stage IV  HTN (hypertension)  H/O colonoscopy  H/O endoscopy      Home Medications:  amLODIPine 5 mg oral tablet: 1 tab(s) orally once a day (at bedtime) (19 Feb 2018 22:29)  Aspirin Enteric Coated 81 mg oral delayed release tablet: 1 tab(s) orally once a day (19 Feb 2018 22:33)  atorvastatin 80 mg oral tablet: 1 tab(s) orally once a day (19 Feb 2018 22:34)  bisacodyl 5 mg oral delayed release tablet: 1 tab(s) orally once a day (at bedtime) (19 Feb 2018 22:34)  clopidogrel 75 mg oral tablet: 1 tab(s) orally once a day (19 Feb 2018 22:35)  darbepoetin iggy: 40 microgram(s) injectable every 10 days (19 Feb 2018 22:32)  ergocalciferol: 96375 international unit(s) buccal once a week on friday  (19 Feb 2018 22:38)  ferrous sulfate 325 mg (65 mg elemental iron) oral tablet: 1 tab(s) orally 3 times a day (19 Feb 2018 22:39)  insulin glargine: 20 unit(s) subcutaneous once a day (in the morning) (19 Feb 2018 22:40)  insulin glargine 100 units/mL subcutaneous solution: 10 unit(s) subcutaneous once a day (at bedtime) (19 Feb 2018 22:41)  labetalol 200 mg oral tablet: 1 tab(s) orally 2 times a day (19 Feb 2018 22:42)  pantoprazole 40 mg oral delayed release tablet: 1 tab(s) orally once a day (19 Feb 2018 22:43)  [place] Caps oral capsule: 1 cap(s) orally once a day (19 Feb 2018 22:43)  Senna 8.6 mg oral tablet: 2 tab(s) orally once a day (at bedtime) (19 Feb 2018 22:44)  sodium bicarbonate 650 mg oral tablet: 1 tab(s) orally 2 times a day (19 Feb 2018 22:45)      MEDICATIONS  (STANDING):  amLODIPine   Tablet 10 milliGRAM(s) Oral at bedtime  artificial  tears Solution 1 Drop(s) Both EYES every 6 hours  ascorbic acid      ascorbic acid 500 milliGRAM(s) Oral daily  aspirin enteric coated 81 milliGRAM(s) Oral daily  benzocaine 20% Spray 1 Spray(s) Topical three times a day  clopidogrel Tablet 75 milliGRAM(s) Oral daily  dextrose 5%. 1000 milliLiter(s) (50 mL/Hr) IV Continuous <Continuous>  dextrose 50% Injectable 12.5 Gram(s) IV Push once  dextrose 50% Injectable 25 Gram(s) IV Push once  dextrose 50% Injectable 25 Gram(s) IV Push once  docusate sodium 100 milliGRAM(s) Oral three times a day  ferrous sulfate Oral Tab/Cap - Peds 325 milliGRAM(s) Oral three times a day with meals  FIRST- Mouthwash  BLM 30 milliLiter(s) Swish and Spit every 4 hours  heparin  Injectable 5000 Unit(s) SubCutaneous every 8 hours  hydrocortisone hemorrhoidal Suppository 1 Suppository(s) Rectal at bedtime  insulin lispro (HumaLOG) corrective regimen sliding scale   SubCutaneous three times a day before meals  labetalol 200 milliGRAM(s) Oral three times a day  magnesium sulfate  IVPB 2 Gram(s) IV Intermittent once  pantoprazole    Tablet 40 milliGRAM(s) Oral before breakfast  pantoprazole   Suspension 40 milliGRAM(s) Oral daily  sevelamer hydrochloride 800 milliGRAM(s) Oral three times a day with meals  sodium bicarbonate 650 milliGRAM(s) Oral every 8 hours  sodium chloride 0.9%. 1000 milliLiter(s) (50 mL/Hr) IV Continuous <Continuous>  tobramycin/dexamethasone Ointment 1 Application(s) Both EYES every 6 hours  vitamin A &amp; D Ointment 1 Application(s) Topical every 4 hours  zinc sulfate 220 milliGRAM(s) Oral daily    MEDICATIONS  (PRN):  acetaminophen   Tablet 650 milliGRAM(s) Oral every 4 hours PRN For Temp greater than 38.5 C (101.3 F)  acetaminophen   Tablet. 650 milliGRAM(s) Oral every 6 hours PRN Mild Pain (1 - 3)  benzocaine 15 mG/menthol 3.6 mG Lozenge 1 Lozenge Oral every 3 hours PRN Sore Throat  bisacodyl 5 milliGRAM(s) Oral daily PRN Constipation  dextrose Gel 1 Dose(s) Oral once PRN Blood Glucose LESS THAN 70 milliGRAM(s)/deciliter  diphenhydrAMINE   Injectable 25 milliGRAM(s) IV Push at bedtime PRN Insomnia  glucagon  Injectable 1 milliGRAM(s) IntraMuscular once PRN Glucose LESS THAN 70 milligrams/deciliter  morphine  - Injectable 2 milliGRAM(s) IV Push every 4 hours PRN Moderate Pain (4 - 6)  ondansetron Injectable 4 milliGRAM(s) IV Push every 6 hours PRN Nausea  risperiDONE   Tablet 0.25 milliGRAM(s) Oral daily PRN aggitation  senna 2 Tablet(s) Oral at bedtime PRN Constipation      Allergies    allopurinol (Rash)  shellfish (Rash)    Intolerances            PHYSICAL EXAM:   Vital Signs:  Vital Signs Last 24 Hrs  T(C): 37.1 (28 Feb 2018 08:46), Max: 37.1 (28 Feb 2018 08:46)  T(F): 98.8 (28 Feb 2018 08:46), Max: 98.8 (28 Feb 2018 08:46)  HR: 68 (28 Feb 2018 08:46) (68 - 84)  BP: 131/62 (28 Feb 2018 08:46) (129/65 - 136/73)  BP(mean): --  RR: 18 (28 Feb 2018 08:46) (18 - 18)  SpO2: --  Daily     Daily     GENERAL:  no distress  HEENT:  erythema and patchy scabs of cheeks, fading erythema  of rash -scalp and forehead; crusted bloody lips  CHEST:   no increased effort, breath sounds clear  HEART:  Regular rhythm  ABDOMEN:  fading drying rash, few crusted open areas, Soft, non-tender, non-distended, normoactive bowel sounds,  no masses ,no hepato-splenomegaly, no signs of chronic liver disease  EXTEREMITIES:  no cyanosis  Skin: generalized rash drying, fading  Rectal: no masses    LABS:                        9.3    7.14  )-----------( 185      ( 27 Feb 2018 17:17 )             29.5       Hemoglobin: 9.3 g/dL (02-27-18 @ 17:17)  Hemoglobin: 9.1 g/dL (02-27-18 @ 00:24)  Hemoglobin: 9.8 g/dL (02-25-18 @ 16:00)      02-27    137  |  109  |  59<H>  ----------------------------<  180<H>  5.0   |  22  |  3.0<H>    Ca    8.1<L>      27 Feb 2018 17:17  Mg     1.6     02-27    TPro  6.2  /  Alb  2.3<L>  /  TBili  0.5  /  DBili  x   /  AST  54<H>  /  ALT  26  /  AlkPhos  91  02-27          Alanine Aminotransferase (ALT/SGPT): 26 U/L (02-27-18 @ 17:17)  Alkaline Phosphatase, Serum: 91 U/L (02-27-18 @ 17:17)  Aspartate Aminotransferase (AST/SGOT): 54 U/L (02-27-18 @ 17:17)  Alkaline Phosphatase, Serum: 76 U/L (02-25-18 @ 16:00)  Aspartate Aminotransferase (AST/SGOT): 26 U/L (02-25-18 @ 16:00)  Alanine Aminotransferase (ALT/SGPT): 21 U/L (02-25-18 @ 16:00)            RADIOLOGY & ADDITIONAL TESTS: INTERVAL HPI/OVERNIGHT EVENTS:    61 yo M with PMHx of diverticulosis, hemorrhoids, gastritis admitted for SJS complains of brbpr x 1day.  Pt reports 1 bowel movement overnight without any evidence of blood.  Pt still has some trouble having bowel movements.  No other complaints.    Colonoscopy performed 11/10/17 by Dr. Hinds at [facility]. Colonoscopy found diverticulosis in the cecum with no evidence of diverticular bleeding and internal hemorrhoids with no active bleeding.     ROS wnl except above    PAST MEDICAL & SURGICAL HISTORY:  Diverticulosis  Gastritis  Hyperlipidemia  CVA (cerebral vascular accident)  Anemia in chronic illness  Diabetes mellitus with end-stage renal disease  Benign hypertension with CKD (chronic kidney disease) stage IV  HTN (hypertension)  H/O colonoscopy  H/O endoscopy      Home Medications:  amLODIPine 5 mg oral tablet: 1 tab(s) orally once a day (at bedtime) (19 Feb 2018 22:29)  Aspirin Enteric Coated 81 mg oral delayed release tablet: 1 tab(s) orally once a day (19 Feb 2018 22:33)  atorvastatin 80 mg oral tablet: 1 tab(s) orally once a day (19 Feb 2018 22:34)  bisacodyl 5 mg oral delayed release tablet: 1 tab(s) orally once a day (at bedtime) (19 Feb 2018 22:34)  clopidogrel 75 mg oral tablet: 1 tab(s) orally once a day (19 Feb 2018 22:35)  darbepoetin iggy: 40 microgram(s) injectable every 10 days (19 Feb 2018 22:32)  ergocalciferol: 13526 international unit(s) buccal once a week on friday  (19 Feb 2018 22:38)  ferrous sulfate 325 mg (65 mg elemental iron) oral tablet: 1 tab(s) orally 3 times a day (19 Feb 2018 22:39)  insulin glargine: 20 unit(s) subcutaneous once a day (in the morning) (19 Feb 2018 22:40)  insulin glargine 100 units/mL subcutaneous solution: 10 unit(s) subcutaneous once a day (at bedtime) (19 Feb 2018 22:41)  labetalol 200 mg oral tablet: 1 tab(s) orally 2 times a day (19 Feb 2018 22:42)  pantoprazole 40 mg oral delayed release tablet: 1 tab(s) orally once a day (19 Feb 2018 22:43)  [place] Caps oral capsule: 1 cap(s) orally once a day (19 Feb 2018 22:43)  Senna 8.6 mg oral tablet: 2 tab(s) orally once a day (at bedtime) (19 Feb 2018 22:44)  sodium bicarbonate 650 mg oral tablet: 1 tab(s) orally 2 times a day (19 Feb 2018 22:45)      MEDICATIONS  (STANDING):  amLODIPine   Tablet 10 milliGRAM(s) Oral at bedtime  artificial  tears Solution 1 Drop(s) Both EYES every 6 hours  ascorbic acid      ascorbic acid 500 milliGRAM(s) Oral daily  aspirin enteric coated 81 milliGRAM(s) Oral daily  benzocaine 20% Spray 1 Spray(s) Topical three times a day  clopidogrel Tablet 75 milliGRAM(s) Oral daily  dextrose 5%. 1000 milliLiter(s) (50 mL/Hr) IV Continuous <Continuous>  dextrose 50% Injectable 12.5 Gram(s) IV Push once  dextrose 50% Injectable 25 Gram(s) IV Push once  dextrose 50% Injectable 25 Gram(s) IV Push once  docusate sodium 100 milliGRAM(s) Oral three times a day  ferrous sulfate Oral Tab/Cap - Peds 325 milliGRAM(s) Oral three times a day with meals  FIRST- Mouthwash  BLM 30 milliLiter(s) Swish and Spit every 4 hours  heparin  Injectable 5000 Unit(s) SubCutaneous every 8 hours  hydrocortisone hemorrhoidal Suppository 1 Suppository(s) Rectal at bedtime  insulin lispro (HumaLOG) corrective regimen sliding scale   SubCutaneous three times a day before meals  labetalol 200 milliGRAM(s) Oral three times a day  magnesium sulfate  IVPB 2 Gram(s) IV Intermittent once  pantoprazole    Tablet 40 milliGRAM(s) Oral before breakfast  pantoprazole   Suspension 40 milliGRAM(s) Oral daily  sevelamer hydrochloride 800 milliGRAM(s) Oral three times a day with meals  sodium bicarbonate 650 milliGRAM(s) Oral every 8 hours  sodium chloride 0.9%. 1000 milliLiter(s) (50 mL/Hr) IV Continuous <Continuous>  tobramycin/dexamethasone Ointment 1 Application(s) Both EYES every 6 hours  vitamin A &amp; D Ointment 1 Application(s) Topical every 4 hours  zinc sulfate 220 milliGRAM(s) Oral daily    MEDICATIONS  (PRN):  acetaminophen   Tablet 650 milliGRAM(s) Oral every 4 hours PRN For Temp greater than 38.5 C (101.3 F)  acetaminophen   Tablet. 650 milliGRAM(s) Oral every 6 hours PRN Mild Pain (1 - 3)  benzocaine 15 mG/menthol 3.6 mG Lozenge 1 Lozenge Oral every 3 hours PRN Sore Throat  bisacodyl 5 milliGRAM(s) Oral daily PRN Constipation  dextrose Gel 1 Dose(s) Oral once PRN Blood Glucose LESS THAN 70 milliGRAM(s)/deciliter  diphenhydrAMINE   Injectable 25 milliGRAM(s) IV Push at bedtime PRN Insomnia  glucagon  Injectable 1 milliGRAM(s) IntraMuscular once PRN Glucose LESS THAN 70 milligrams/deciliter  morphine  - Injectable 2 milliGRAM(s) IV Push every 4 hours PRN Moderate Pain (4 - 6)  ondansetron Injectable 4 milliGRAM(s) IV Push every 6 hours PRN Nausea  risperiDONE   Tablet 0.25 milliGRAM(s) Oral daily PRN aggitation  senna 2 Tablet(s) Oral at bedtime PRN Constipation      Allergies    allopurinol (Rash)  shellfish (Rash)    Intolerances            PHYSICAL EXAM:   Vital Signs:  Vital Signs Last 24 Hrs  T(C): 37.1 (28 Feb 2018 08:46), Max: 37.1 (28 Feb 2018 08:46)  T(F): 98.8 (28 Feb 2018 08:46), Max: 98.8 (28 Feb 2018 08:46)  HR: 68 (28 Feb 2018 08:46) (68 - 84)  BP: 131/62 (28 Feb 2018 08:46) (129/65 - 136/73)  BP(mean): --  RR: 18 (28 Feb 2018 08:46) (18 - 18)  SpO2: --  Daily     Daily     GENERAL:  no distress  HEENT:  erythema and patchy scabs of cheeks, fading erythema  of rash -scalp and forehead; crusted bloody lips  CHEST:   no increased effort, breath sounds clear  HEART:  Regular rhythm  ABDOMEN:  fading drying rash, few crusted open areas, Soft, non-tender, non-distended, normoactive bowel sounds,  no masses ,no hepato-splenomegaly, no signs of chronic liver disease  EXTEREMITIES:  no cyanosis  Skin: generalized rash drying, fading  Rectal: no masses    LABS:                        9.3    7.14  )-----------( 185      ( 27 Feb 2018 17:17 )             29.5       Hemoglobin: 9.3 g/dL (02-27-18 @ 17:17)  Hemoglobin: 9.1 g/dL (02-27-18 @ 00:24)  Hemoglobin: 9.8 g/dL (02-25-18 @ 16:00)      02-27    137  |  109  |  59<H>  ----------------------------<  180<H>  5.0   |  22  |  3.0<H>    Ca    8.1<L>      27 Feb 2018 17:17  Mg     1.6     02-27    TPro  6.2  /  Alb  2.3<L>  /  TBili  0.5  /  DBili  x   /  AST  54<H>  /  ALT  26  /  AlkPhos  91  02-27          Alanine Aminotransferase (ALT/SGPT): 26 U/L (02-27-18 @ 17:17)  Alkaline Phosphatase, Serum: 91 U/L (02-27-18 @ 17:17)  Aspartate Aminotransferase (AST/SGOT): 54 U/L (02-27-18 @ 17:17)  Alkaline Phosphatase, Serum: 76 U/L (02-25-18 @ 16:00)  Aspartate Aminotransferase (AST/SGOT): 26 U/L (02-25-18 @ 16:00)  Alanine Aminotransferase (ALT/SGPT): 21 U/L (02-25-18 @ 16:00)            RADIOLOGY & ADDITIONAL TESTS:

## 2018-03-01 VITALS
RESPIRATION RATE: 18 BRPM | HEART RATE: 66 BPM | TEMPERATURE: 96 F | DIASTOLIC BLOOD PRESSURE: 74 MMHG | SYSTOLIC BLOOD PRESSURE: 159 MMHG

## 2018-03-01 RX ORDER — ACETAMINOPHEN 500 MG
2 TABLET ORAL
Qty: 0 | Refills: 0 | COMMUNITY
Start: 2018-03-01

## 2018-03-01 RX ORDER — HYDROCORTISONE 1 %
1 OINTMENT (GRAM) TOPICAL
Qty: 0 | Refills: 0 | COMMUNITY
Start: 2018-03-01

## 2018-03-01 RX ADMIN — Medication 200 MILLIGRAM(S): at 12:39

## 2018-03-01 RX ADMIN — Medication 2: at 12:44

## 2018-03-01 RX ADMIN — Medication 1 DROP(S): at 12:31

## 2018-03-01 RX ADMIN — ZINC SULFATE TAB 220 MG (50 MG ZINC EQUIVALENT) 220 MILLIGRAM(S): 220 (50 ZN) TAB at 12:40

## 2018-03-01 RX ADMIN — HEPARIN SODIUM 5000 UNIT(S): 5000 INJECTION INTRAVENOUS; SUBCUTANEOUS at 06:15

## 2018-03-01 RX ADMIN — Medication 1 APPLICATION(S): at 01:13

## 2018-03-01 RX ADMIN — Medication 650 MILLIGRAM(S): at 12:40

## 2018-03-01 RX ADMIN — Medication 1 DROP(S): at 06:17

## 2018-03-01 RX ADMIN — SEVELAMER CARBONATE 800 MILLIGRAM(S): 2400 POWDER, FOR SUSPENSION ORAL at 08:43

## 2018-03-01 RX ADMIN — Medication 200 MILLIGRAM(S): at 06:16

## 2018-03-01 RX ADMIN — Medication 650 MILLIGRAM(S): at 06:15

## 2018-03-01 RX ADMIN — PANTOPRAZOLE SODIUM 40 MILLIGRAM(S): 20 TABLET, DELAYED RELEASE ORAL at 12:33

## 2018-03-01 RX ADMIN — Medication 81 MILLIGRAM(S): at 12:36

## 2018-03-01 RX ADMIN — Medication 325 MILLIGRAM(S): at 12:36

## 2018-03-01 RX ADMIN — HEPARIN SODIUM 5000 UNIT(S): 5000 INJECTION INTRAVENOUS; SUBCUTANEOUS at 12:37

## 2018-03-01 RX ADMIN — TOBRAMYCIN AND DEXAMETHASONE 1 APPLICATION(S): 1; 3 SUSPENSION/ DROPS OPHTHALMIC at 06:18

## 2018-03-01 RX ADMIN — PANTOPRAZOLE SODIUM 40 MILLIGRAM(S): 20 TABLET, DELAYED RELEASE ORAL at 08:43

## 2018-03-01 RX ADMIN — Medication 1 SPRAY(S): at 06:17

## 2018-03-01 RX ADMIN — DIPHENHYDRAMINE HYDROCHLORIDE AND LIDOCAINE HYDROCHLORIDE AND ALUMINUM HYDROXIDE AND MAGNESIUM HYDRO 30 MILLILITER(S): KIT at 06:18

## 2018-03-01 RX ADMIN — Medication 100 MILLIGRAM(S): at 12:36

## 2018-03-01 RX ADMIN — TOBRAMYCIN AND DEXAMETHASONE 1 APPLICATION(S): 1; 3 SUSPENSION/ DROPS OPHTHALMIC at 12:44

## 2018-03-01 RX ADMIN — Medication 100 MILLIGRAM(S): at 06:15

## 2018-03-01 RX ADMIN — SEVELAMER CARBONATE 800 MILLIGRAM(S): 2400 POWDER, FOR SUSPENSION ORAL at 12:40

## 2018-03-01 RX ADMIN — DIPHENHYDRAMINE HYDROCHLORIDE AND LIDOCAINE HYDROCHLORIDE AND ALUMINUM HYDROXIDE AND MAGNESIUM HYDRO 30 MILLILITER(S): KIT at 01:13

## 2018-03-01 RX ADMIN — CLOPIDOGREL BISULFATE 75 MILLIGRAM(S): 75 TABLET, FILM COATED ORAL at 12:36

## 2018-03-01 RX ADMIN — Medication 1 APPLICATION(S): at 12:28

## 2018-03-01 RX ADMIN — Medication 500 MILLIGRAM(S): at 12:36

## 2018-03-01 RX ADMIN — Medication 1 APPLICATION(S): at 06:18

## 2018-03-01 RX ADMIN — DIPHENHYDRAMINE HYDROCHLORIDE AND LIDOCAINE HYDROCHLORIDE AND ALUMINUM HYDROXIDE AND MAGNESIUM HYDRO 30 MILLILITER(S): KIT at 12:29

## 2018-03-01 RX ADMIN — Medication 325 MILLIGRAM(S): at 08:41

## 2018-03-01 NOTE — DISCHARGE NOTE ADULT - CARE PROVIDER_API CALL
Antoine Ayala), Internal Medicine; Nephrology  [address] [place] [address]  Phone: [phone]  Fax: [phone]    Bishop Marte), Plastic Surgery  [address] [place] [address]  Phone: [phone]  Fax: [phone]    Massimo Moctezuma (SHIRLENE), Gastroenterology; Internal Medicine  [address] [place] [address]  Phone: [phone]  Fax: [phone]

## 2018-03-01 NOTE — PROGRESS NOTE ADULT - ASSESSMENT
A/P    Stable, TENS healing well  Rectal bleeding -resolved  Bx site - suture removed  Discharge home today  ESRD- f/u with nephrology   F/U with PMD, [facility] as outpt.  Moisturizer to skin .  Skin care discussed with son

## 2018-03-01 NOTE — PROGRESS NOTE ADULT - PROVIDER SPECIALTY LIST ADULT
Burn
Nephrology
Burn
Nephrology

## 2018-03-01 NOTE — DISCHARGE NOTE ADULT - PATIENT PORTAL LINK FT
You can access the [facility] Patient Portal, offered by [facility], by registering with the following website: [email or web address]

## 2018-03-01 NOTE — DISCHARGE NOTE ADULT - PLAN OF CARE
Resolution of rash Can take showers with soap and water.  Apply A&D to open areas  Follow up with Dr. Marte at burn clinic.  Continue your home medications.  Follow up with PMD, Gastroenterologist, nephrologist.  Follow up with Psychiatry.

## 2018-03-01 NOTE — DISCHARGE NOTE ADULT - HOSPITAL COURSE
Patient 61 yo male with PMHx DM on allopurinol presented for chelsi eugene's syndrome. Onc inpatient patient received IV fluids, IVIG, steroids, wound care q12, paincontrol, and continued all home medications. Patient was found to have one episode of BRBPR and was seen by gen surgery and gastroenterology and no acute intervention was warranted. Once improvement was noted patient was discharged home with follow up and instructed to continue home medications except allopurinol.

## 2018-03-01 NOTE — DISCHARGE NOTE ADULT - CARE PROVIDERS DIRECT ADDRESSES
,DirectAddress_Unknown,[email or web address]

## 2018-03-01 NOTE — PROGRESS NOTE ADULT - SUBJECTIVE AND OBJECTIVE BOX
Pt has no complaints. Feels well and is ready to go home  VSS   Joshua diet , BM 's no  blood     Skin- faded rash with exfoliation of healed areas - scalp face extremities and trunk.  Lips- largely healed - dried blood; sclera clear  Abd- biopsy site healed

## 2018-03-01 NOTE — DISCHARGE NOTE ADULT - CARE PLAN
Principal Discharge DX:	Flowers-Kurt syndrome  Goal:	Resolution of rash  Assessment and plan of treatment:	Can take showers with soap and water.  Apply A&D to open areas  Follow up with Dr. Marte at burn clinic.  Continue your home medications.  Follow up with PMD, Gastroenterologist, nephrologist.  Follow up with Psychiatry.

## 2018-03-01 NOTE — DISCHARGE NOTE ADULT - MEDICATION SUMMARY - MEDICATIONS TO TAKE
I will START or STAY ON the medications listed below when I get home from the hospital:    Aspirin Enteric Coated 81 mg oral delayed release tablet  -- 1 tab(s) by mouth once a day  -- Indication: For Cad    acetaminophen 325 mg oral tablet  -- 2 tab(s) by mouth every 6 hours, As needed, Mild Pain (1 - 3)  -- Indication: For pain    sodium bicarbonate 650 mg oral tablet  -- 1 tab(s) by mouth 2 times a day  -- Indication: For Sebas    insulin glargine  -- 20 unit(s) subcutaneous once a day (in the morning)  -- Indication: For Diabetes mellitus with end-stage renal disease    insulin glargine 100 units/mL subcutaneous solution  -- 10 unit(s) subcutaneous once a day (at bedtime)  -- Indication: For Diabetes mellitus with end-stage renal disease    atorvastatin 80 mg oral tablet  -- 1 tab(s) by mouth once a day  -- Indication: For Hyperlipidemia    clopidogrel 75 mg oral tablet  -- 1 tab(s) by mouth once a day  -- Indication: For Cad    labetalol 200 mg oral tablet  -- 1 tab(s) by mouth 2 times a day  -- Indication: For HTN (hypertension)    amLODIPine 5 mg oral tablet  -- 1 tab(s) by mouth once a day (at bedtime)  -- Indication: For HTN (hypertension)    hydrocortisone 25 mg rectal suppository  -- 1 suppository(ies) rectally once a day (at bedtime)  -- Indication: For Hemorrhoids    vitamin A & D topical ointment  -- 1 application on skin every 4 hours  -- Indication: For Hemorrhoids    darbepoetin iggy  -- 40 microgram(s) injectable every 10 days  -- Indication: For Anemia in chronic illness    ferrous sulfate 325 mg (65 mg elemental iron) oral tablet  -- 1 tab(s) by mouth 3 times a day  -- Indication: For Anemia in chronic illness    Senna 8.6 mg oral tablet  -- 2 tab(s) by mouth once a day (at bedtime)  -- Indication: For Constipation    bisacodyl 5 mg oral delayed release tablet  -- 1 tab(s) by mouth once a day (at bedtime)  -- Indication: For Constipation    pantoprazole 40 mg oral delayed release tablet  -- 1 tab(s) by mouth once a day  -- Indication: For GERD    [place] Caps oral capsule  -- 1 cap(s) by mouth once a day  -- Indication: For CKD    ergocalciferol  -- 68521 international unit(s) between cheek and gums once a week on friday   -- Indication: For CKD

## 2018-03-02 DIAGNOSIS — R13.10 DYSPHAGIA, UNSPECIFIED: ICD-10-CM

## 2018-03-02 DIAGNOSIS — K64.9 UNSPECIFIED HEMORRHOIDS: ICD-10-CM

## 2018-03-02 DIAGNOSIS — Z91.013 ALLERGY TO SEAFOOD: ICD-10-CM

## 2018-03-02 DIAGNOSIS — E87.2 ACIDOSIS: ICD-10-CM

## 2018-03-02 DIAGNOSIS — D63.1 ANEMIA IN CHRONIC KIDNEY DISEASE: ICD-10-CM

## 2018-03-02 DIAGNOSIS — K12.1 OTHER FORMS OF STOMATITIS: ICD-10-CM

## 2018-03-02 DIAGNOSIS — H40.033 ANATOMICAL NARROW ANGLE, BILATERAL: ICD-10-CM

## 2018-03-02 DIAGNOSIS — Z88.8 ALLERGY STATUS TO OTHER DRUGS, MEDICAMENTS AND BIOLOGICAL SUBSTANCES STATUS: ICD-10-CM

## 2018-03-02 DIAGNOSIS — K62.5 HEMORRHAGE OF ANUS AND RECTUM: ICD-10-CM

## 2018-03-02 DIAGNOSIS — E11.22 TYPE 2 DIABETES MELLITUS WITH DIABETIC CHRONIC KIDNEY DISEASE: ICD-10-CM

## 2018-03-02 DIAGNOSIS — F43.23 ADJUSTMENT DISORDER WITH MIXED ANXIETY AND DEPRESSED MOOD: ICD-10-CM

## 2018-03-02 DIAGNOSIS — F05 DELIRIUM DUE TO KNOWN PHYSIOLOGICAL CONDITION: ICD-10-CM

## 2018-03-02 DIAGNOSIS — N18.4 CHRONIC KIDNEY DISEASE, STAGE 4 (SEVERE): ICD-10-CM

## 2018-03-02 DIAGNOSIS — M10.9 GOUT, UNSPECIFIED: ICD-10-CM

## 2018-03-02 DIAGNOSIS — E11.311 TYPE 2 DIABETES MELLITUS WITH UNSPECIFIED DIABETIC RETINOPATHY WITH MACULAR EDEMA: ICD-10-CM

## 2018-03-02 DIAGNOSIS — L51.1 STEVENS-JOHNSON SYNDROME: ICD-10-CM

## 2018-03-02 DIAGNOSIS — Z86.73 PERSONAL HISTORY OF TRANSIENT ISCHEMIC ATTACK (TIA), AND CEREBRAL INFARCTION WITHOUT RESIDUAL DEFICITS: ICD-10-CM

## 2018-03-02 DIAGNOSIS — R13.11 DYSPHAGIA, ORAL PHASE: ICD-10-CM

## 2018-03-02 DIAGNOSIS — H10.223 PSEUDOMEMBRANOUS CONJUNCTIVITIS, BILATERAL: ICD-10-CM

## 2018-03-02 DIAGNOSIS — T50.4X5A ADVERSE EFFECT OF DRUGS AFFECTING URIC ACID METABOLISM, INITIAL ENCOUNTER: ICD-10-CM

## 2018-03-02 DIAGNOSIS — E83.39 OTHER DISORDERS OF PHOSPHORUS METABOLISM: ICD-10-CM

## 2018-03-02 DIAGNOSIS — I12.9 HYPERTENSIVE CHRONIC KIDNEY DISEASE WITH STAGE 1 THROUGH STAGE 4 CHRONIC KIDNEY DISEASE, OR UNSPECIFIED CHRONIC KIDNEY DISEASE: ICD-10-CM

## 2018-03-05 DIAGNOSIS — L51.3 STEVENS-JOHNSON SYNDROME-TOXIC EPIDERMAL NECROLYSIS OVERLAP SYNDROME: ICD-10-CM

## 2018-03-05 DIAGNOSIS — L49.6: ICD-10-CM

## 2018-06-12 NOTE — H&P ADULT - NSHPPHYSICALEXAM_GEN_ALL_CORE
Gen: AAO*3, cooperative, communicating with the help of son at the bedside. No acute distress.  HEENT: AT/NC, a few areas of rash noted over the frontal scalp. Injected conjunctiva . Oral cavity (+) for mucosal hemorrhagic crust , cracked lips.   Neck supple, no JVD, no lymphadenopathy Gen: AAO*3, cooperative, communicating with the help of son at the bedside. No acute distress.  HEENT: AT/NC, a few areas of rash noted over the frontal scalp. Injected conjunctiva . Oral cavity (+) for mucosal hemorrhagic crust , cracked lips.   Neck supple, no JVD, no lymphadenopathy  CV: RRR  Lungs: CTA-B/L   ABD: S/ND/NT (+) BS   Ext: Full ROM, palpable pulses. Planter tenderness   Skin/mucosa: generalized erythematous maculopapulous rash involving the extremities, the trunk, and back. Two small ruptured blisters noted in the supraumbilical region on the left, with denuded skin (+) Opal. Oral mucosa is involved. All other mucosal surfaces are intact. decreased efrain/decreased step length/decreased stride length/decreased weight-shifting ability

## 2023-09-21 NOTE — DISCHARGE NOTE ADULT - NSFTFCONTACT3DT_GEN_ALL_CORE
01-Mar-2018 Post-Care Instructions: I reviewed with the patient in detail post-care instructions. Patient is not to engage in any heavy lifting, exercise, or swimming for the next 14 days. Should the patient develop any fevers, chills, bleeding, severe pain patient will contact the office immediately.
